# Patient Record
Sex: FEMALE | Race: BLACK OR AFRICAN AMERICAN | NOT HISPANIC OR LATINO | URBAN - METROPOLITAN AREA
[De-identification: names, ages, dates, MRNs, and addresses within clinical notes are randomized per-mention and may not be internally consistent; named-entity substitution may affect disease eponyms.]

---

## 2019-06-01 ENCOUNTER — INPATIENT (INPATIENT)
Facility: HOSPITAL | Age: 49
LOS: 12 days | Discharge: HOME | End: 2019-06-14
Attending: INTERNAL MEDICINE | Admitting: INTERNAL MEDICINE
Payer: MEDICAID

## 2019-06-01 VITALS
TEMPERATURE: 99 F | RESPIRATION RATE: 20 BRPM | OXYGEN SATURATION: 99 % | SYSTOLIC BLOOD PRESSURE: 154 MMHG | HEART RATE: 99 BPM | DIASTOLIC BLOOD PRESSURE: 87 MMHG

## 2019-06-01 DIAGNOSIS — F10.239 ALCOHOL DEPENDENCE WITH WITHDRAWAL, UNSPECIFIED: ICD-10-CM

## 2019-06-01 DIAGNOSIS — R56.9 UNSPECIFIED CONVULSIONS: ICD-10-CM

## 2019-06-01 DIAGNOSIS — Z78.9 OTHER SPECIFIED HEALTH STATUS: ICD-10-CM

## 2019-06-01 DIAGNOSIS — E83.42 HYPOMAGNESEMIA: ICD-10-CM

## 2019-06-01 LAB
ALBUMIN SERPL ELPH-MCNC: 3.6 G/DL — SIGNIFICANT CHANGE UP (ref 3.3–5)
ALBUMIN SERPL ELPH-MCNC: 3.9 G/DL — SIGNIFICANT CHANGE UP (ref 3.3–5)
ALP SERPL-CCNC: 76 U/L — SIGNIFICANT CHANGE UP (ref 40–120)
ALP SERPL-CCNC: 82 U/L — SIGNIFICANT CHANGE UP (ref 40–120)
ALT FLD-CCNC: 53 U/L — SIGNIFICANT CHANGE UP (ref 12–78)
ALT FLD-CCNC: 59 U/L — SIGNIFICANT CHANGE UP (ref 12–78)
ANION GAP SERPL CALC-SCNC: 13 MMOL/L — SIGNIFICANT CHANGE UP (ref 5–17)
ANION GAP SERPL CALC-SCNC: 16 MMOL/L — SIGNIFICANT CHANGE UP (ref 5–17)
AST SERPL-CCNC: 70 U/L — HIGH (ref 15–37)
AST SERPL-CCNC: 78 U/L — HIGH (ref 15–37)
BASOPHILS # BLD AUTO: 0.03 K/UL — SIGNIFICANT CHANGE UP (ref 0–0.2)
BASOPHILS NFR BLD AUTO: 0.4 % — SIGNIFICANT CHANGE UP (ref 0–2)
BILIRUB DIRECT SERPL-MCNC: 0.45 MG/DL — HIGH (ref 0.05–0.2)
BILIRUB INDIRECT FLD-MCNC: 1.2 MG/DL — HIGH (ref 0.2–1)
BILIRUB SERPL-MCNC: 1.3 MG/DL — HIGH (ref 0.2–1.2)
BILIRUB SERPL-MCNC: 1.6 MG/DL — HIGH (ref 0.2–1.2)
BUN SERPL-MCNC: 4 MG/DL — LOW (ref 7–23)
BUN SERPL-MCNC: 6 MG/DL — LOW (ref 7–23)
CALCIUM SERPL-MCNC: 10 MG/DL — SIGNIFICANT CHANGE UP (ref 8.5–10.1)
CALCIUM SERPL-MCNC: 8.9 MG/DL — SIGNIFICANT CHANGE UP (ref 8.5–10.1)
CHLORIDE SERPL-SCNC: 96 MMOL/L — SIGNIFICANT CHANGE UP (ref 96–108)
CHLORIDE SERPL-SCNC: 99 MMOL/L — SIGNIFICANT CHANGE UP (ref 96–108)
CO2 SERPL-SCNC: 26 MMOL/L — SIGNIFICANT CHANGE UP (ref 22–31)
CO2 SERPL-SCNC: 26 MMOL/L — SIGNIFICANT CHANGE UP (ref 22–31)
CREAT SERPL-MCNC: 0.68 MG/DL — SIGNIFICANT CHANGE UP (ref 0.5–1.3)
CREAT SERPL-MCNC: 0.85 MG/DL — SIGNIFICANT CHANGE UP (ref 0.5–1.3)
EOSINOPHIL # BLD AUTO: 0 K/UL — SIGNIFICANT CHANGE UP (ref 0–0.5)
EOSINOPHIL NFR BLD AUTO: 0 % — SIGNIFICANT CHANGE UP (ref 0–6)
ETHANOL SERPL-MCNC: <10 MG/DL — SIGNIFICANT CHANGE UP (ref 0–10)
GLUCOSE BLDC GLUCOMTR-MCNC: 128 MG/DL — HIGH (ref 70–99)
GLUCOSE SERPL-MCNC: 131 MG/DL — HIGH (ref 70–99)
GLUCOSE SERPL-MCNC: 92 MG/DL — SIGNIFICANT CHANGE UP (ref 70–99)
HCT VFR BLD CALC: 33.3 % — LOW (ref 34.5–45)
HGB BLD-MCNC: 10.3 G/DL — LOW (ref 11.5–15.5)
IMM GRANULOCYTES NFR BLD AUTO: 0.8 % — SIGNIFICANT CHANGE UP (ref 0–1.5)
LACTATE SERPL-SCNC: 6.3 MMOL/L — CRITICAL HIGH (ref 0.7–2)
LYMPHOCYTES # BLD AUTO: 0.59 K/UL — LOW (ref 1–3.3)
LYMPHOCYTES # BLD AUTO: 6.9 % — LOW (ref 13–44)
MAGNESIUM SERPL-MCNC: 1 MG/DL — CRITICAL LOW (ref 1.6–2.6)
MAGNESIUM SERPL-MCNC: 1.4 MG/DL — LOW (ref 1.6–2.6)
MCHC RBC-ENTMCNC: 22.8 PG — LOW (ref 27–34)
MCHC RBC-ENTMCNC: 30.9 GM/DL — LOW (ref 32–36)
MCV RBC AUTO: 73.8 FL — LOW (ref 80–100)
MONOCYTES # BLD AUTO: 0.37 K/UL — SIGNIFICANT CHANGE UP (ref 0–0.9)
MONOCYTES NFR BLD AUTO: 4.3 % — SIGNIFICANT CHANGE UP (ref 2–14)
NEUTROPHILS # BLD AUTO: 7.51 K/UL — HIGH (ref 1.8–7.4)
NEUTROPHILS NFR BLD AUTO: 87.6 % — HIGH (ref 43–77)
NRBC # BLD: 0 /100 WBCS — SIGNIFICANT CHANGE UP (ref 0–0)
PHOSPHATE SERPL-MCNC: 2.9 MG/DL — SIGNIFICANT CHANGE UP (ref 2.5–4.5)
PHOSPHATE SERPL-MCNC: 3.8 MG/DL — SIGNIFICANT CHANGE UP (ref 2.5–4.5)
PLATELET # BLD AUTO: 217 K/UL — SIGNIFICANT CHANGE UP (ref 150–400)
POTASSIUM SERPL-MCNC: 2.9 MMOL/L — CRITICAL LOW (ref 3.5–5.3)
POTASSIUM SERPL-MCNC: 3 MMOL/L — LOW (ref 3.5–5.3)
POTASSIUM SERPL-SCNC: 2.9 MMOL/L — CRITICAL LOW (ref 3.5–5.3)
POTASSIUM SERPL-SCNC: 3 MMOL/L — LOW (ref 3.5–5.3)
PROT SERPL-MCNC: 8.3 GM/DL — SIGNIFICANT CHANGE UP (ref 6–8.3)
PROT SERPL-MCNC: 8.7 GM/DL — HIGH (ref 6–8.3)
RBC # BLD: 4.51 M/UL — SIGNIFICANT CHANGE UP (ref 3.8–5.2)
RBC # FLD: 19.4 % — HIGH (ref 10.3–14.5)
SODIUM SERPL-SCNC: 138 MMOL/L — SIGNIFICANT CHANGE UP (ref 135–145)
SODIUM SERPL-SCNC: 138 MMOL/L — SIGNIFICANT CHANGE UP (ref 135–145)
WBC # BLD: 8.57 K/UL — SIGNIFICANT CHANGE UP (ref 3.8–10.5)
WBC # FLD AUTO: 8.57 K/UL — SIGNIFICANT CHANGE UP (ref 3.8–10.5)

## 2019-06-01 PROCEDURE — 99285 EMERGENCY DEPT VISIT HI MDM: CPT

## 2019-06-01 PROCEDURE — 71045 X-RAY EXAM CHEST 1 VIEW: CPT | Mod: 26

## 2019-06-01 PROCEDURE — 93010 ELECTROCARDIOGRAM REPORT: CPT

## 2019-06-01 PROCEDURE — 70450 CT HEAD/BRAIN W/O DYE: CPT | Mod: 26

## 2019-06-01 RX ORDER — SODIUM CHLORIDE 9 MG/ML
2000 INJECTION INTRAMUSCULAR; INTRAVENOUS; SUBCUTANEOUS ONCE
Refills: 0 | Status: COMPLETED | OUTPATIENT
Start: 2019-06-01 | End: 2019-06-01

## 2019-06-01 RX ORDER — MAGNESIUM OXIDE 400 MG ORAL TABLET 241.3 MG
800 TABLET ORAL EVERY 4 HOURS
Refills: 0 | Status: COMPLETED | OUTPATIENT
Start: 2019-06-01 | End: 2019-06-02

## 2019-06-01 RX ORDER — ACETAMINOPHEN 500 MG
650 TABLET ORAL EVERY 6 HOURS
Refills: 0 | Status: DISCONTINUED | OUTPATIENT
Start: 2019-06-01 | End: 2019-06-14

## 2019-06-01 RX ORDER — MAGNESIUM SULFATE 500 MG/ML
2 VIAL (ML) INJECTION ONCE
Refills: 0 | Status: DISCONTINUED | OUTPATIENT
Start: 2019-06-01 | End: 2019-06-01

## 2019-06-01 RX ORDER — POTASSIUM CHLORIDE 20 MEQ
10 PACKET (EA) ORAL
Refills: 0 | Status: DISCONTINUED | OUTPATIENT
Start: 2019-06-01 | End: 2019-06-01

## 2019-06-01 RX ORDER — MAGNESIUM OXIDE 400 MG ORAL TABLET 241.3 MG
400 TABLET ORAL ONCE
Refills: 0 | Status: DISCONTINUED | OUTPATIENT
Start: 2019-06-01 | End: 2019-06-01

## 2019-06-01 RX ORDER — FOLIC ACID 0.8 MG
1 TABLET ORAL DAILY
Refills: 0 | Status: DISCONTINUED | OUTPATIENT
Start: 2019-06-01 | End: 2019-06-14

## 2019-06-01 RX ORDER — SODIUM CHLORIDE 9 MG/ML
1000 INJECTION, SOLUTION INTRAVENOUS
Refills: 0 | Status: DISCONTINUED | OUTPATIENT
Start: 2019-06-01 | End: 2019-06-05

## 2019-06-01 RX ORDER — THIAMINE MONONITRATE (VIT B1) 100 MG
100 TABLET ORAL DAILY
Refills: 0 | Status: COMPLETED | OUTPATIENT
Start: 2019-06-01 | End: 2019-06-04

## 2019-06-01 RX ORDER — MAGNESIUM SULFATE 500 MG/ML
2 VIAL (ML) INJECTION ONCE
Refills: 0 | Status: COMPLETED | OUTPATIENT
Start: 2019-06-01 | End: 2019-06-01

## 2019-06-01 RX ORDER — POTASSIUM CHLORIDE 20 MEQ
40 PACKET (EA) ORAL ONCE
Refills: 0 | Status: DISCONTINUED | OUTPATIENT
Start: 2019-06-01 | End: 2019-06-01

## 2019-06-01 RX ORDER — POTASSIUM CHLORIDE 20 MEQ
40 PACKET (EA) ORAL EVERY 4 HOURS
Refills: 0 | Status: COMPLETED | OUTPATIENT
Start: 2019-06-01 | End: 2019-06-02

## 2019-06-01 RX ORDER — POTASSIUM CHLORIDE 20 MEQ
10 PACKET (EA) ORAL ONCE
Refills: 0 | Status: COMPLETED | OUTPATIENT
Start: 2019-06-01 | End: 2019-06-01

## 2019-06-01 RX ORDER — PANTOPRAZOLE SODIUM 20 MG/1
40 TABLET, DELAYED RELEASE ORAL
Refills: 0 | Status: DISCONTINUED | OUTPATIENT
Start: 2019-06-01 | End: 2019-06-14

## 2019-06-01 RX ORDER — HEPARIN SODIUM 5000 [USP'U]/ML
5000 INJECTION INTRAVENOUS; SUBCUTANEOUS EVERY 12 HOURS
Refills: 0 | Status: DISCONTINUED | OUTPATIENT
Start: 2019-06-01 | End: 2019-06-14

## 2019-06-01 RX ADMIN — Medication 50 GRAM(S): at 15:25

## 2019-06-01 RX ADMIN — Medication 650 MILLIGRAM(S): at 19:39

## 2019-06-01 RX ADMIN — Medication 650 MILLIGRAM(S): at 17:30

## 2019-06-01 RX ADMIN — HEPARIN SODIUM 5000 UNIT(S): 5000 INJECTION INTRAVENOUS; SUBCUTANEOUS at 19:51

## 2019-06-01 RX ADMIN — Medication 100 MILLIEQUIVALENT(S): at 15:24

## 2019-06-01 RX ADMIN — SODIUM CHLORIDE 2000 MILLILITER(S): 9 INJECTION INTRAMUSCULAR; INTRAVENOUS; SUBCUTANEOUS at 15:00

## 2019-06-01 RX ADMIN — Medication 2 MILLIGRAM(S): at 21:43

## 2019-06-01 RX ADMIN — Medication 50 GRAM(S): at 19:51

## 2019-06-01 RX ADMIN — SODIUM CHLORIDE 80 MILLILITER(S): 9 INJECTION, SOLUTION INTRAVENOUS at 18:01

## 2019-06-01 RX ADMIN — Medication 2 MILLIGRAM(S): at 14:30

## 2019-06-01 RX ADMIN — Medication 2 MILLIGRAM(S): at 18:18

## 2019-06-01 NOTE — ED ADULT NURSE REASSESSMENT NOTE - NS ED NURSE REASSESS COMMENT FT1
pt seizure noted at bedside , medicated and safety pt maintained , hob increased and oxygen in place. ED Attending aware and at bedside, contd to monitor

## 2019-06-01 NOTE — ED PROVIDER NOTE - OBJECTIVE STATEMENT
48 year old female with PMH of alcohol abuse, otherwise denies other medical issues - has had prior seizure in past as well but not on any meds - states may have been related to not drinking alcohol. Pt states last drink was 2 days ago.

## 2019-06-01 NOTE — PROVIDER CONTACT NOTE (CRITICAL VALUE NOTIFICATION) - ACTION/TREATMENT ORDERED:
pt currently on a banana bag, received po potassium in the ed. as per pa lab will be repeated in the am
ivf

## 2019-06-01 NOTE — ED ADULT NURSE NOTE - OBJECTIVE STATEMENT
pt stated she has seizures and does not take medication, pt alert and responsive. seizure precautions maintained

## 2019-06-01 NOTE — H&P ADULT - HISTORY OF PRESENT ILLNESS
47yo, BF with PMH of Alcohol Use, otherwise denies other medical issues, presents with Sz today and yesterday.  Patient  has had prior seizure in past as well but not on any meds - states may have been related to not drinking alcohol. Pt states last drink was 2 days ago. Unable to give amount of daily use.  Patient had sz in ER.  Denies CP, SOB, cough, palpitation, n/v/d, fever, chills, weakness, abdominal pain, dysuria.

## 2019-06-01 NOTE — H&P ADULT - NSHPPHYSICALEXAM_GEN_ALL_CORE
PHYSICAL EXAM:  GENERAL: NAD,   HEAD:  Atraumatic, Normocephalic  EYES: EOMI, PERRLA, conjunctiva and sclera clear  ENMT: No tonsillar erythema, exudates, or enlargement; Moist mucous membranes, No lesions  NECK: Supple, No JVD, Normal thyroid  NERVOUS SYSTEM:  Sleepy; Motor Strength 5/5 B/L; No tremor  CHEST/LUNG: Clear bilaterally; No rales, rhonchi, wheezing, or rubs  HEART: Regular rate and rhythm; No murmurs, rubs, or gallops  ABDOMEN: Soft, Nontender, Nondistended; Bowel sounds present  EXTREMITIES:  2+ Peripheral Pulses, No clubbing, cyanosis, or edema  LYMPH: No lymphadenopathy noted  SKIN: No rashes or lesions

## 2019-06-01 NOTE — H&P ADULT - NSHPLABSRESULTS_GEN_ALL_CORE
LABS:                        10.3   8.57  )-----------( 217      ( 01 Jun 2019 14:00 )             33.3     06-01    138  |  96  |  4<L>  ----------------------------<  131<H>  3.0<L>   |  26  |  0.85    Ca    10.0      01 Jun 2019 14:00  Phos  2.9     06-01  Mg     1.0     06-01    TPro  8.7<H>  /  Alb  3.9  /  TBili  1.3<H>  /  DBili  x   /  AST  78<H>  /  ALT  59  /  AlkPhos  82  06-01        CAPILLARY BLOOD GLUCOSE      POCT Blood Glucose.: 128 mg/dL (01 Jun 2019 12:14)      Magnesium, Serum: 1.0: TYPE:(C=Critical, N=Notification, A=Abnormal) C    < from: CT Head No Cont (06.01.19 @ 15:34) >      IMPRESSION:    Unremarkable noncontrast CT of the brain.      < end of copied text >    < from: Xray Chest 1 View-PORTABLE IMMEDIATE (06.01.19 @ 13:37) >      IMPRESSION:    Clear lungs.      < end of copied text >

## 2019-06-01 NOTE — ED ADULT TRIAGE NOTE - CHIEF COMPLAINT QUOTE
BIBA for  witnessed seizure at home. history of alcohol abuse. unknown when pt had last drink. pt awake alert at triage. denies history of seizure.

## 2019-06-01 NOTE — ED ADULT NURSE NOTE - NSIMPLEMENTINTERV_GEN_ALL_ED
Implemented All Universal Safety Interventions:  [place] to call system. Call bell, personal items and telephone within reach. Instruct patient to call for assistance. Room bathroom lighting operational. Non-slip footwear when patient is off stretcher. Physically safe environment: no spills, clutter or unnecessary equipment. Stretcher in lowest position, wheels locked, appropriate side rails in place.

## 2019-06-02 LAB
ALBUMIN SERPL ELPH-MCNC: 3.3 G/DL — SIGNIFICANT CHANGE UP (ref 3.3–5)
ALP SERPL-CCNC: 68 U/L — SIGNIFICANT CHANGE UP (ref 40–120)
ALT FLD-CCNC: 49 U/L — SIGNIFICANT CHANGE UP (ref 12–78)
AMPHET UR-MCNC: NEGATIVE — SIGNIFICANT CHANGE UP
ANION GAP SERPL CALC-SCNC: 9 MMOL/L — SIGNIFICANT CHANGE UP (ref 5–17)
APPEARANCE UR: ABNORMAL
AST SERPL-CCNC: 53 U/L — HIGH (ref 15–37)
BACTERIA # UR AUTO: ABNORMAL
BARBITURATES UR SCN-MCNC: NEGATIVE — SIGNIFICANT CHANGE UP
BENZODIAZ UR-MCNC: NEGATIVE — SIGNIFICANT CHANGE UP
BILIRUB SERPL-MCNC: 1.4 MG/DL — HIGH (ref 0.2–1.2)
BILIRUB UR-MCNC: ABNORMAL
BUN SERPL-MCNC: 8 MG/DL — SIGNIFICANT CHANGE UP (ref 7–23)
CALCIUM SERPL-MCNC: 8.6 MG/DL — SIGNIFICANT CHANGE UP (ref 8.5–10.1)
CHLORIDE SERPL-SCNC: 102 MMOL/L — SIGNIFICANT CHANGE UP (ref 96–108)
CO2 SERPL-SCNC: 30 MMOL/L — SIGNIFICANT CHANGE UP (ref 22–31)
COCAINE METAB.OTHER UR-MCNC: NEGATIVE — SIGNIFICANT CHANGE UP
COLOR SPEC: ABNORMAL
CREAT SERPL-MCNC: 0.49 MG/DL — LOW (ref 0.5–1.3)
DIFF PNL FLD: ABNORMAL
EPI CELLS # UR: ABNORMAL
GLUCOSE SERPL-MCNC: 76 MG/DL — SIGNIFICANT CHANGE UP (ref 70–99)
GLUCOSE UR QL: NEGATIVE MG/DL — SIGNIFICANT CHANGE UP
GRAN CASTS # UR COMP ASSIST: ABNORMAL /LPF
HAV IGM SER-ACNC: SIGNIFICANT CHANGE UP
HBV CORE IGM SER-ACNC: SIGNIFICANT CHANGE UP
HBV SURFACE AG SER-ACNC: SIGNIFICANT CHANGE UP
HCT VFR BLD CALC: 31.8 % — LOW (ref 34.5–45)
HCV AB S/CO SERPL IA: 0.09 S/CO — SIGNIFICANT CHANGE UP (ref 0–0.99)
HCV AB SERPL-IMP: SIGNIFICANT CHANGE UP
HGB BLD-MCNC: 9.8 G/DL — LOW (ref 11.5–15.5)
KETONES UR-MCNC: ABNORMAL
LEUKOCYTE ESTERASE UR-ACNC: ABNORMAL
MAGNESIUM SERPL-MCNC: 2.2 MG/DL — SIGNIFICANT CHANGE UP (ref 1.6–2.6)
MCHC RBC-ENTMCNC: 23.1 PG — LOW (ref 27–34)
MCHC RBC-ENTMCNC: 30.8 GM/DL — LOW (ref 32–36)
MCV RBC AUTO: 75 FL — LOW (ref 80–100)
METHADONE UR-MCNC: NEGATIVE — SIGNIFICANT CHANGE UP
NITRITE UR-MCNC: NEGATIVE — SIGNIFICANT CHANGE UP
NRBC # BLD: 0 /100 WBCS — SIGNIFICANT CHANGE UP (ref 0–0)
OPIATES UR-MCNC: NEGATIVE — SIGNIFICANT CHANGE UP
PCP SPEC-MCNC: SIGNIFICANT CHANGE UP
PCP UR-MCNC: NEGATIVE — SIGNIFICANT CHANGE UP
PH UR: 6 — SIGNIFICANT CHANGE UP (ref 5–8)
PHOSPHATE SERPL-MCNC: 3.3 MG/DL — SIGNIFICANT CHANGE UP (ref 2.5–4.5)
PLATELET # BLD AUTO: 198 K/UL — SIGNIFICANT CHANGE UP (ref 150–400)
POTASSIUM SERPL-MCNC: 3.5 MMOL/L — SIGNIFICANT CHANGE UP (ref 3.5–5.3)
POTASSIUM SERPL-SCNC: 3.5 MMOL/L — SIGNIFICANT CHANGE UP (ref 3.5–5.3)
PROT SERPL-MCNC: 7.6 GM/DL — SIGNIFICANT CHANGE UP (ref 6–8.3)
PROT UR-MCNC: 500 MG/DL
RBC # BLD: 4.24 M/UL — SIGNIFICANT CHANGE UP (ref 3.8–5.2)
RBC # FLD: 19.9 % — HIGH (ref 10.3–14.5)
RBC CASTS # UR COMP ASSIST: ABNORMAL /HPF (ref 0–4)
SODIUM SERPL-SCNC: 141 MMOL/L — SIGNIFICANT CHANGE UP (ref 135–145)
SP GR SPEC: 1.02 — SIGNIFICANT CHANGE UP (ref 1.01–1.02)
THC UR QL: NEGATIVE — SIGNIFICANT CHANGE UP
UROBILINOGEN FLD QL: 4 MG/DL
WBC # BLD: 7.88 K/UL — SIGNIFICANT CHANGE UP (ref 3.8–10.5)
WBC # FLD AUTO: 7.88 K/UL — SIGNIFICANT CHANGE UP (ref 3.8–10.5)
WBC UR QL: SIGNIFICANT CHANGE UP

## 2019-06-02 RX ORDER — SODIUM CHLORIDE 9 MG/ML
1000 INJECTION INTRAMUSCULAR; INTRAVENOUS; SUBCUTANEOUS
Refills: 0 | Status: DISCONTINUED | OUTPATIENT
Start: 2019-06-02 | End: 2019-06-05

## 2019-06-02 RX ADMIN — Medication 2 MILLIGRAM(S): at 02:05

## 2019-06-02 RX ADMIN — Medication 2 MILLIGRAM(S): at 10:58

## 2019-06-02 RX ADMIN — Medication 2 MILLIGRAM(S): at 15:47

## 2019-06-02 RX ADMIN — Medication 2 MILLIGRAM(S): at 13:45

## 2019-06-02 RX ADMIN — Medication 2 MILLIGRAM(S): at 18:20

## 2019-06-02 RX ADMIN — Medication 40 MILLIEQUIVALENT(S): at 00:33

## 2019-06-02 RX ADMIN — Medication 1.5 MILLIGRAM(S): at 21:57

## 2019-06-02 RX ADMIN — Medication 1 TABLET(S): at 11:05

## 2019-06-02 RX ADMIN — HEPARIN SODIUM 5000 UNIT(S): 5000 INJECTION INTRAVENOUS; SUBCUTANEOUS at 05:32

## 2019-06-02 RX ADMIN — Medication 40 MILLIEQUIVALENT(S): at 05:32

## 2019-06-02 RX ADMIN — Medication 1.5 MILLIGRAM(S): at 17:19

## 2019-06-02 RX ADMIN — HEPARIN SODIUM 5000 UNIT(S): 5000 INJECTION INTRAVENOUS; SUBCUTANEOUS at 17:22

## 2019-06-02 RX ADMIN — PANTOPRAZOLE SODIUM 40 MILLIGRAM(S): 20 TABLET, DELAYED RELEASE ORAL at 05:32

## 2019-06-02 RX ADMIN — SODIUM CHLORIDE 80 MILLILITER(S): 9 INJECTION INTRAMUSCULAR; INTRAVENOUS; SUBCUTANEOUS at 17:23

## 2019-06-02 RX ADMIN — MAGNESIUM OXIDE 400 MG ORAL TABLET 800 MILLIGRAM(S): 241.3 TABLET ORAL at 00:33

## 2019-06-02 RX ADMIN — Medication 40 MILLIEQUIVALENT(S): at 02:06

## 2019-06-02 RX ADMIN — Medication 100 MILLIGRAM(S): at 11:05

## 2019-06-02 RX ADMIN — Medication 1 MILLIGRAM(S): at 11:05

## 2019-06-02 RX ADMIN — Medication 2 MILLIGRAM(S): at 20:58

## 2019-06-02 RX ADMIN — MAGNESIUM OXIDE 400 MG ORAL TABLET 800 MILLIGRAM(S): 241.3 TABLET ORAL at 02:06

## 2019-06-02 RX ADMIN — Medication 2 MILLIGRAM(S): at 12:53

## 2019-06-02 RX ADMIN — Medication 2 MILLIGRAM(S): at 05:31

## 2019-06-02 NOTE — PROGRESS NOTE ADULT - SUBJECTIVE AND OBJECTIVE BOX
Patient is a 48y old  Female who presents with a chief complaint of Seizure (2019 19:04)      SUBJECTIVE/OBJECTIVE:    Restless.   CIWA 16    MEDICATIONS  (STANDING):  folic acid 1 milliGRAM(s) Oral daily  heparin  Injectable 5000 Unit(s) SubCutaneous every 12 hours  LORazepam   Injectable   IV Push   LORazepam   Injectable 1.5 milliGRAM(s) IV Push every 4 hours  multivitamin 1 Tablet(s) Oral daily  pantoprazole    Tablet 40 milliGRAM(s) Oral before breakfast  sodium chloride 0.9% 1000 milliLiter(s) (80 mL/Hr) IV Continuous <Continuous>  sodium chloride 0.9%. 1000 milliLiter(s) (80 mL/Hr) IV Continuous <Continuous>  thiamine 100 milliGRAM(s) Oral daily    MEDICATIONS  (PRN):  acetaminophen   Tablet .. 650 milliGRAM(s) Oral every 6 hours PRN Mild Pain (1 - 3)  LORazepam   Injectable 2 milliGRAM(s) IV Push every 2 hours PRN CIWA-Ar score increase by 2 points and a total score of 7 or less      Allergies    No Known Allergies    Intolerances          Vital Signs Last 24 Hrs  T(C): 37.1 (2019 11:06), Max: 38 (2019 00:05)  T(F): 98.8 (2019 11:06), Max: 100.4 (2019 00:05)  HR: 96 (2019 11:06) (82 - 106)  BP: 152/92 (2019 11:06) (152/92 - 170/85)  BP(mean): --  RR: 17 (2019 11:06) (17 - 20)  SpO2: 100% (2019 11:06) (96% - 100%)        Physical Exam:  Physical Exam: PHYSICAL EXAM:  GENERAL: NAD,   HEAD:  Atraumatic, Normocephalic  EYES: EOMI, PERRLA, conjunctiva and sclera clear  ENMT: No tonsillar erythema, exudates, or enlargement; Moist mucous membranes, No lesions  NECK: Supple, No JVD, Normal thyroid  NERVOUS SYSTEM:  Sleepy; Motor Strength 5/5 B/L; No tremor  CHEST/LUNG: Clear bilaterally; No rales, rhonchi, wheezing, or rubs  HEART: Regular rate and rhythm; No murmurs, rubs, or gallops  ABDOMEN: Soft, Nontender, Nondistended; Bowel sounds present  EXTREMITIES:  2+ Peripheral Pulses, No clubbing, cyanosis, or edema  LYMPH: No lymphadenopathy noted SKIN: No rashes or lesions	          LABS:                        9.8    7.88  )-----------( 198      ( 2019 07:03 )             31.8     06-    141  |  102  |  8   ----------------------------<  76  3.5   |  30  |  0.49<L>    Ca    8.6      2019 07:03  Phos  3.3     06-  Mg     2.2     06-    TPro  7.6  /  Alb  3.3  /  TBili  1.4<H>  /  DBili  x   /  AST  53<H>  /  ALT  49  /  AlkPhos  68  06-      Urinalysis Basic - ( 2019 06:52 )    Color: [place] / Appearance: Slightly Turbid / S.020 / pH: x  Gluc: x / Ketone: Small  / Bili: Small / Urobili: 4 mg/dL   Blood: x / Protein: 500 mg/dL / Nitrite: Negative   Leuk Esterase: Trace / RBC: 3-5 /HPF / WBC 3-5   Sq Epi: x / Non Sq Epi: Moderate / Bacteria: Moderate      CAPILLARY BLOOD GLUCOSE              RADIOLOGY & ADDITIONAL TESTS:        Consultant(s) Notes Reviewed:  [ ] YES  [ ] NO

## 2019-06-03 LAB
ALBUMIN SERPL ELPH-MCNC: 3.4 G/DL — SIGNIFICANT CHANGE UP (ref 3.3–5)
ALP SERPL-CCNC: 63 U/L — SIGNIFICANT CHANGE UP (ref 40–120)
ALT FLD-CCNC: 49 U/L — SIGNIFICANT CHANGE UP (ref 12–78)
ANION GAP SERPL CALC-SCNC: 6 MMOL/L — SIGNIFICANT CHANGE UP (ref 5–17)
AST SERPL-CCNC: 69 U/L — HIGH (ref 15–37)
BILIRUB SERPL-MCNC: 0.7 MG/DL — SIGNIFICANT CHANGE UP (ref 0.2–1.2)
BUN SERPL-MCNC: 9 MG/DL — SIGNIFICANT CHANGE UP (ref 7–23)
CALCIUM SERPL-MCNC: 9.5 MG/DL — SIGNIFICANT CHANGE UP (ref 8.5–10.1)
CHLORIDE SERPL-SCNC: 103 MMOL/L — SIGNIFICANT CHANGE UP (ref 96–108)
CO2 SERPL-SCNC: 28 MMOL/L — SIGNIFICANT CHANGE UP (ref 22–31)
CREAT SERPL-MCNC: 0.6 MG/DL — SIGNIFICANT CHANGE UP (ref 0.5–1.3)
CULTURE RESULTS: SIGNIFICANT CHANGE UP
GLUCOSE BLDC GLUCOMTR-MCNC: 95 MG/DL — SIGNIFICANT CHANGE UP (ref 70–99)
GLUCOSE SERPL-MCNC: 92 MG/DL — SIGNIFICANT CHANGE UP (ref 70–99)
HCT VFR BLD CALC: 31.8 % — LOW (ref 34.5–45)
HGB BLD-MCNC: 9.8 G/DL — LOW (ref 11.5–15.5)
MAGNESIUM SERPL-MCNC: 1.7 MG/DL — SIGNIFICANT CHANGE UP (ref 1.6–2.6)
MCHC RBC-ENTMCNC: 23.3 PG — LOW (ref 27–34)
MCHC RBC-ENTMCNC: 30.8 GM/DL — LOW (ref 32–36)
MCV RBC AUTO: 75.5 FL — LOW (ref 80–100)
NRBC # BLD: 0 /100 WBCS — SIGNIFICANT CHANGE UP (ref 0–0)
PLATELET # BLD AUTO: 189 K/UL — SIGNIFICANT CHANGE UP (ref 150–400)
POTASSIUM SERPL-MCNC: 3.3 MMOL/L — LOW (ref 3.5–5.3)
POTASSIUM SERPL-SCNC: 3.3 MMOL/L — LOW (ref 3.5–5.3)
PROT SERPL-MCNC: 7.4 GM/DL — SIGNIFICANT CHANGE UP (ref 6–8.3)
RBC # BLD: 4.21 M/UL — SIGNIFICANT CHANGE UP (ref 3.8–5.2)
RBC # FLD: 19.4 % — HIGH (ref 10.3–14.5)
SODIUM SERPL-SCNC: 137 MMOL/L — SIGNIFICANT CHANGE UP (ref 135–145)
SPECIMEN SOURCE: SIGNIFICANT CHANGE UP
WBC # BLD: 4.99 K/UL — SIGNIFICANT CHANGE UP (ref 3.8–10.5)
WBC # FLD AUTO: 4.99 K/UL — SIGNIFICANT CHANGE UP (ref 3.8–10.5)

## 2019-06-03 RX ORDER — POTASSIUM CHLORIDE 20 MEQ
10 PACKET (EA) ORAL
Refills: 0 | Status: DISCONTINUED | OUTPATIENT
Start: 2019-06-03 | End: 2019-06-03

## 2019-06-03 RX ORDER — MAGNESIUM SULFATE 500 MG/ML
1 VIAL (ML) INJECTION ONCE
Refills: 0 | Status: COMPLETED | OUTPATIENT
Start: 2019-06-03 | End: 2019-06-03

## 2019-06-03 RX ORDER — POTASSIUM CHLORIDE 20 MEQ
20 PACKET (EA) ORAL
Refills: 0 | Status: DISCONTINUED | OUTPATIENT
Start: 2019-06-03 | End: 2019-06-03

## 2019-06-03 RX ORDER — PHENOBARBITAL 60 MG
130 TABLET ORAL ONCE
Refills: 0 | Status: DISCONTINUED | OUTPATIENT
Start: 2019-06-03 | End: 2019-06-03

## 2019-06-03 RX ORDER — POTASSIUM CHLORIDE 20 MEQ
10 PACKET (EA) ORAL
Refills: 0 | Status: COMPLETED | OUTPATIENT
Start: 2019-06-03 | End: 2019-06-03

## 2019-06-03 RX ORDER — DIPHENHYDRAMINE HCL 50 MG
25 CAPSULE ORAL ONCE
Refills: 0 | Status: COMPLETED | OUTPATIENT
Start: 2019-06-03 | End: 2019-06-03

## 2019-06-03 RX ADMIN — Medication 2 MILLIGRAM(S): at 05:58

## 2019-06-03 RX ADMIN — Medication 4 MILLIGRAM(S): at 13:01

## 2019-06-03 RX ADMIN — Medication 2 MILLIGRAM(S): at 00:55

## 2019-06-03 RX ADMIN — SODIUM CHLORIDE 80 MILLILITER(S): 9 INJECTION INTRAMUSCULAR; INTRAVENOUS; SUBCUTANEOUS at 19:32

## 2019-06-03 RX ADMIN — Medication 4 MILLIGRAM(S): at 07:36

## 2019-06-03 RX ADMIN — Medication 130 MILLIGRAM(S): at 07:52

## 2019-06-03 RX ADMIN — Medication 100 MILLIEQUIVALENT(S): at 13:03

## 2019-06-03 RX ADMIN — Medication 100 GRAM(S): at 11:46

## 2019-06-03 RX ADMIN — Medication 1.5 MILLIGRAM(S): at 05:33

## 2019-06-03 RX ADMIN — Medication 1.5 MILLIGRAM(S): at 02:07

## 2019-06-03 RX ADMIN — Medication 4 MILLIGRAM(S): at 10:17

## 2019-06-03 RX ADMIN — Medication 4 MILLIGRAM(S): at 18:18

## 2019-06-03 RX ADMIN — Medication 4 MILLIGRAM(S): at 21:00

## 2019-06-03 RX ADMIN — Medication 2 MILLIGRAM(S): at 19:31

## 2019-06-03 RX ADMIN — Medication 100 MILLIEQUIVALENT(S): at 14:29

## 2019-06-03 RX ADMIN — Medication 2 MILLIGRAM(S): at 15:39

## 2019-06-03 RX ADMIN — Medication 25 MILLIGRAM(S): at 00:55

## 2019-06-03 RX ADMIN — HEPARIN SODIUM 5000 UNIT(S): 5000 INJECTION INTRAVENOUS; SUBCUTANEOUS at 18:18

## 2019-06-03 RX ADMIN — Medication 2 MILLIGRAM(S): at 22:54

## 2019-06-03 RX ADMIN — Medication 100 MILLIEQUIVALENT(S): at 15:34

## 2019-06-03 NOTE — PROGRESS NOTE ADULT - SUBJECTIVE AND OBJECTIVE BOX
Patient is a 48y old  Female who presents with a chief complaint of Seizure (2019 14:25)      SUBJECTIVE/OBJECTIVE:    Sedated. was agitated earlier.    MEDICATIONS  (STANDING):  folic acid 1 milliGRAM(s) Oral daily  heparin  Injectable 5000 Unit(s) SubCutaneous every 12 hours  LORazepam   Injectable 4 milliGRAM(s) IV Push every 4 hours  LORazepam   Injectable   IV Push   multivitamin 1 Tablet(s) Oral daily  pantoprazole    Tablet 40 milliGRAM(s) Oral before breakfast  potassium chloride  10 mEq/100 mL IVPB 10 milliEquivalent(s) IV Intermittent every 1 hour  potassium chloride  10 mEq/100 mL IVPB 10 milliEquivalent(s) IV Intermittent every 1 hour  sodium chloride 0.9% 1000 milliLiter(s) (80 mL/Hr) IV Continuous <Continuous>  sodium chloride 0.9%. 1000 milliLiter(s) (80 mL/Hr) IV Continuous <Continuous>  thiamine 100 milliGRAM(s) Oral daily    MEDICATIONS  (PRN):  acetaminophen   Tablet .. 650 milliGRAM(s) Oral every 6 hours PRN Mild Pain (1 - 3)      Allergies    No Known Allergies    Intolerances          Vital Signs Last 24 Hrs  T(C): 35.9 (2019 07:53), Max: 37.2 (2019 17:45)  T(F): 96.6 (2019 07:53), Max: 98.9 (2019 17:45)  HR: 85 (2019 11:27) (84 - 98)  BP: 135/96 (2019 11:27) (135/96 - 153/103)  BP(mean): --  RR: 18 (2019 11:27) (17 - 20)  SpO2: 99% (2019 11:27) (97% - 99%)    PHYSICAL EXAM:  GENERAL: NAD, well-groomed, well-developed  HEAD:  Atraumatic, Normocephalic  EYES: EOMI, PERRLA, conjunctiva and sclera clear  ENMT: No tonsillar erythema, exudates, or enlargement; Moist mucous membranes, No lesions  NECK: Supple, No JVD, Normal thyroid  NERVOUS SYSTEM:  Alert & Oriented X3; Motor Strength 5/5 B/L   CHEST/LUNG: Clear bilaterally; No rales, rhonchi, wheezing, or rubs  HEART: Regular rate and rhythm; No murmurs, rubs, or gallops  ABDOMEN: Soft, Nontender, Nondistended; Bowel sounds present  EXTREMITIES:  2+ Peripheral Pulses, No clubbing, cyanosis, or edema  LYMPH: No lymphadenopathy noted  SKIN: No rashes or lesions    LABS:                        9.8    4.99  )-----------( 189      ( 2019 07:50 )             31.8     06-03    137  |  103  |  9   ----------------------------<  92  3.3<L>   |  28  |  0.60    Ca    9.5      2019 07:50  Phos  3.3     06-02  Mg     1.7     06-03    TPro  7.4  /  Alb  3.4  /  TBili  0.7  /  DBili  x   /  AST  69<H>  /  ALT  49  /  AlkPhos  63  06-03      Urinalysis Basic - ( 2019 06:52 )    Color: [place] / Appearance: Slightly Turbid / S.020 / pH: x  Gluc: x / Ketone: Small  / Bili: Small / Urobili: 4 mg/dL   Blood: x / Protein: 500 mg/dL / Nitrite: Negative   Leuk Esterase: Trace / RBC: 3-5 /HPF / WBC 3-5   Sq Epi: x / Non Sq Epi: Moderate / Bacteria: Moderate      CAPILLARY BLOOD GLUCOSE      POCT Blood Glucose.: 95 mg/dL (2019 08:09)          RADIOLOGY & ADDITIONAL TESTS:        Consultant(s) Notes Reviewed:  [ ] YES  [ ] NO

## 2019-06-04 DIAGNOSIS — F10.231 ALCOHOL DEPENDENCE WITH WITHDRAWAL DELIRIUM: ICD-10-CM

## 2019-06-04 DIAGNOSIS — F10.10 ALCOHOL ABUSE, UNCOMPLICATED: ICD-10-CM

## 2019-06-04 LAB
ALBUMIN SERPL ELPH-MCNC: 3.2 G/DL — LOW (ref 3.3–5)
ALP SERPL-CCNC: 65 U/L — SIGNIFICANT CHANGE UP (ref 40–120)
ALT FLD-CCNC: 42 U/L — SIGNIFICANT CHANGE UP (ref 12–78)
ANION GAP SERPL CALC-SCNC: 11 MMOL/L — SIGNIFICANT CHANGE UP (ref 5–17)
AST SERPL-CCNC: 55 U/L — HIGH (ref 15–37)
BILIRUB SERPL-MCNC: 0.8 MG/DL — SIGNIFICANT CHANGE UP (ref 0.2–1.2)
BUN SERPL-MCNC: 6 MG/DL — LOW (ref 7–23)
CALCIUM SERPL-MCNC: 9.3 MG/DL — SIGNIFICANT CHANGE UP (ref 8.5–10.1)
CHLORIDE SERPL-SCNC: 105 MMOL/L — SIGNIFICANT CHANGE UP (ref 96–108)
CO2 SERPL-SCNC: 24 MMOL/L — SIGNIFICANT CHANGE UP (ref 22–31)
CREAT SERPL-MCNC: 0.57 MG/DL — SIGNIFICANT CHANGE UP (ref 0.5–1.3)
GLUCOSE SERPL-MCNC: 81 MG/DL — SIGNIFICANT CHANGE UP (ref 70–99)
HCT VFR BLD CALC: 34.9 % — SIGNIFICANT CHANGE UP (ref 34.5–45)
HGB BLD-MCNC: 10.4 G/DL — LOW (ref 11.5–15.5)
MAGNESIUM SERPL-MCNC: 1.5 MG/DL — LOW (ref 1.6–2.6)
MCHC RBC-ENTMCNC: 23.1 PG — LOW (ref 27–34)
MCHC RBC-ENTMCNC: 29.8 GM/DL — LOW (ref 32–36)
MCV RBC AUTO: 77.4 FL — LOW (ref 80–100)
NRBC # BLD: 0 /100 WBCS — SIGNIFICANT CHANGE UP (ref 0–0)
PLATELET # BLD AUTO: 164 K/UL — SIGNIFICANT CHANGE UP (ref 150–400)
POTASSIUM SERPL-MCNC: 3.9 MMOL/L — SIGNIFICANT CHANGE UP (ref 3.5–5.3)
POTASSIUM SERPL-SCNC: 3.9 MMOL/L — SIGNIFICANT CHANGE UP (ref 3.5–5.3)
PROT SERPL-MCNC: 7.2 GM/DL — SIGNIFICANT CHANGE UP (ref 6–8.3)
RBC # BLD: 4.51 M/UL — SIGNIFICANT CHANGE UP (ref 3.8–5.2)
RBC # FLD: 19.8 % — HIGH (ref 10.3–14.5)
SODIUM SERPL-SCNC: 140 MMOL/L — SIGNIFICANT CHANGE UP (ref 135–145)
WBC # BLD: 6.01 K/UL — SIGNIFICANT CHANGE UP (ref 3.8–10.5)
WBC # FLD AUTO: 6.01 K/UL — SIGNIFICANT CHANGE UP (ref 3.8–10.5)

## 2019-06-04 PROCEDURE — 90792 PSYCH DIAG EVAL W/MED SRVCS: CPT

## 2019-06-04 RX ORDER — MAGNESIUM SULFATE 500 MG/ML
2 VIAL (ML) INJECTION ONCE
Refills: 0 | Status: COMPLETED | OUTPATIENT
Start: 2019-06-04 | End: 2019-06-04

## 2019-06-04 RX ORDER — PHENOBARBITAL 60 MG
130 TABLET ORAL ONCE
Refills: 0 | Status: DISCONTINUED | OUTPATIENT
Start: 2019-06-04 | End: 2019-06-04

## 2019-06-04 RX ORDER — THIAMINE MONONITRATE (VIT B1) 100 MG
100 TABLET ORAL DAILY
Refills: 0 | Status: DISCONTINUED | OUTPATIENT
Start: 2019-06-04 | End: 2019-06-04

## 2019-06-04 RX ORDER — THIAMINE MONONITRATE (VIT B1) 100 MG
100 TABLET ORAL DAILY
Refills: 0 | Status: COMPLETED | OUTPATIENT
Start: 2019-06-05 | End: 2019-06-08

## 2019-06-04 RX ORDER — IBUPROFEN 200 MG
400 TABLET ORAL EVERY 6 HOURS
Refills: 0 | Status: DISCONTINUED | OUTPATIENT
Start: 2019-06-04 | End: 2019-06-14

## 2019-06-04 RX ORDER — MAGNESIUM SULFATE 500 MG/ML
1 VIAL (ML) INJECTION ONCE
Refills: 0 | Status: DISCONTINUED | OUTPATIENT
Start: 2019-06-04 | End: 2019-06-04

## 2019-06-04 RX ADMIN — Medication 50 GRAM(S): at 12:37

## 2019-06-04 RX ADMIN — Medication 100 MILLIGRAM(S): at 12:37

## 2019-06-04 RX ADMIN — Medication 3 MILLIGRAM(S): at 17:55

## 2019-06-04 RX ADMIN — HEPARIN SODIUM 5000 UNIT(S): 5000 INJECTION INTRAVENOUS; SUBCUTANEOUS at 17:56

## 2019-06-04 RX ADMIN — Medication 400 MILLIGRAM(S): at 17:54

## 2019-06-04 RX ADMIN — Medication 4 MILLIGRAM(S): at 01:05

## 2019-06-04 RX ADMIN — Medication 650 MILLIGRAM(S): at 01:48

## 2019-06-04 RX ADMIN — Medication 2 MILLIGRAM(S): at 03:01

## 2019-06-04 RX ADMIN — Medication 2 MILLIGRAM(S): at 00:17

## 2019-06-04 RX ADMIN — Medication 3 MILLIGRAM(S): at 21:38

## 2019-06-04 RX ADMIN — Medication 3 MILLIGRAM(S): at 10:03

## 2019-06-04 RX ADMIN — Medication 1 TABLET(S): at 12:37

## 2019-06-04 RX ADMIN — Medication 2 MILLIGRAM(S): at 04:00

## 2019-06-04 RX ADMIN — Medication 3 MILLIGRAM(S): at 14:55

## 2019-06-04 RX ADMIN — SODIUM CHLORIDE 80 MILLILITER(S): 9 INJECTION INTRAMUSCULAR; INTRAVENOUS; SUBCUTANEOUS at 12:38

## 2019-06-04 RX ADMIN — Medication 3 MILLIGRAM(S): at 05:26

## 2019-06-04 RX ADMIN — HEPARIN SODIUM 5000 UNIT(S): 5000 INJECTION INTRAVENOUS; SUBCUTANEOUS at 05:27

## 2019-06-04 RX ADMIN — SODIUM CHLORIDE 80 MILLILITER(S): 9 INJECTION INTRAMUSCULAR; INTRAVENOUS; SUBCUTANEOUS at 06:30

## 2019-06-04 RX ADMIN — Medication 650 MILLIGRAM(S): at 01:18

## 2019-06-04 RX ADMIN — Medication 1 MILLIGRAM(S): at 12:37

## 2019-06-04 RX ADMIN — Medication 130 MILLIGRAM(S): at 04:26

## 2019-06-04 RX ADMIN — Medication 2 MILLIGRAM(S): at 16:57

## 2019-06-04 RX ADMIN — Medication 400 MILLIGRAM(S): at 17:57

## 2019-06-04 RX ADMIN — Medication 2 MILLIGRAM(S): at 12:36

## 2019-06-04 NOTE — PROGRESS NOTE ADULT - SUBJECTIVE AND OBJECTIVE BOX
Patient is a 48y old  Female who presents with a chief complaint of Seizure (04 Jun 2019 04:23)      SUBJECTIVE/OBJECTIVE:    Currently, awake a little confused.  Agitated last night.     MEDICATIONS  (STANDING):  folic acid 1 milliGRAM(s) Oral daily  heparin  Injectable 5000 Unit(s) SubCutaneous every 12 hours  LORazepam   Injectable 3 milliGRAM(s) IV Push every 4 hours  LORazepam   Injectable   IV Push   multivitamin 1 Tablet(s) Oral daily  pantoprazole    Tablet 40 milliGRAM(s) Oral before breakfast  sodium chloride 0.9% 1000 milliLiter(s) (80 mL/Hr) IV Continuous <Continuous>  sodium chloride 0.9%. 1000 milliLiter(s) (80 mL/Hr) IV Continuous <Continuous>    MEDICATIONS  (PRN):  acetaminophen   Tablet .. 650 milliGRAM(s) Oral every 6 hours PRN Mild Pain (1 - 3)  LORazepam   Injectable 2 milliGRAM(s) IV Push every 1 hour PRN CIWA > 7      Allergies    No Known Allergies    Intolerances          Vital Signs Last 24 Hrs  T(C): 36.3 (04 Jun 2019 11:13), Max: 37.1 (04 Jun 2019 00:25)  T(F): 97.3 (04 Jun 2019 11:13), Max: 98.7 (04 Jun 2019 00:25)  HR: 83 (04 Jun 2019 11:13) (78 - 87)  BP: 135/89 (04 Jun 2019 11:13) (135/89 - 156/94)  BP(mean): --  RR: 17 (04 Jun 2019 11:13) (17 - 18)  SpO2: 97% (04 Jun 2019 11:13) (97% - 100%)        PHYSICAL EXAM:  GENERAL: NAD, well-groomed, well-developed  HEAD:  Atraumatic, Normocephalic  EYES: EOMI, PERRLA, conjunctiva and sclera clear  ENMT: No tonsillar erythema, exudates, or enlargement; Moist mucous membranes, No lesions  NECK: Supple, No JVD, Normal thyroid  NERVOUS SYSTEM:  Awake;  Motor Strength 5/5 B/L; No tremor.  CHEST/LUNG: Clear bilaterally; No rales, rhonchi, wheezing, or rubs  HEART: Regular rate and rhythm; No murmurs, rubs, or gallops  ABDOMEN: Soft, Nontender, Nondistended; Bowel sounds present  EXTREMITIES:  2+ Peripheral Pulses, No clubbing, cyanosis, or edema  LYMPH: No lymphadenopathy noted  SKIN: No rashes or lesions        LABS:                        10.4   6.01  )-----------( 164      ( 04 Jun 2019 07:11 )             34.9     06-04    140  |  105  |  6<L>  ----------------------------<  81  3.9   |  24  |  0.57    Ca    9.3      04 Jun 2019 07:11  Mg     1.5     06-04    TPro  7.2  /  Alb  3.2<L>  /  TBili  0.8  /  DBili  x   /  AST  55<H>  /  ALT  42  /  AlkPhos  65  06-04        CAPILLARY BLOOD GLUCOSE              RADIOLOGY & ADDITIONAL TESTS:        Consultant(s) Notes Reviewed:  [xxx ] YES  [ ] NO  CC;  47yo, BF with PMH of Alcohol Use, now in alcohol withdrawal, in agitation  received total of 14mg of ativan, and now 130mg of phenobarbitol, slowly went to sleep  If patient remain CIWA >15, would bring her over to icu for further managment  currently advise to add phenobarbitol 130mg every 2hours prn for CIWA >15

## 2019-06-04 NOTE — BEHAVIORAL HEALTH ASSESSMENT NOTE - OTHER PAST PSYCHIATRIC HISTORY (INCLUDE DETAILS REGARDING ONSET, COURSE OF ILLNESS, INPATIENT/OUTPATIENT TREATMENT)
hx of depression / bereavement due to the death of her younger brother a few years ago when he was 20 years old due to complications from untreated pneumonia.   - no hx of psych admissions/ suicide attempts/ aggression/violence/legal issues  - no hx of psych medication trials

## 2019-06-04 NOTE — BEHAVIORAL HEALTH ASSESSMENT NOTE - SUMMARY
49 yo female with charted hx of Alcohol Abuse (continuous, daily drinking pattern), with hx of withdrawal seizures (onset 05/15; received Keppra) presenting with another withdrawal seizure, placed on CIWA protocol with elevated CIWA scores necessitating addition of phenobarbital as Ativan IVPs have been insufficient to cover the withdrawal symptoms.  RECOMMENDATION:  - agree with ICU Critical care recommendations  - if CIWA remains 15 or above, transfer to ICU  - consider using phenobarbital as primary agent for withdrawal at this time. Limit combination use with Ativan due to synergism / oversedation 47 yo female with charted hx of Alcohol Abuse (continuous, daily drinking pattern), with hx of withdrawal seizures (onset 05/15; received Keppra) presenting with another withdrawal seizure, placed on CIWA protocol with elevated CIWA scores necessitating addition of phenobarbital as Ativan IVPs have been insufficient to cover the withdrawal symptoms.  RECOMMENDATION:  - agree with ICU Critical care recommendations  - if CIWA remains 15 or above, transfer to ICU  - Patient seems to be stable at this time so can continue Ativan IVP  protocol as ordered 47 yo female with charted hx of Alcohol Abuse (continuous, daily drinking pattern), with hx of withdrawal seizures (onset 05/15; received Keppra) presenting with another withdrawal seizure, placed on CIWA protocol with elevated CIWA scores necessitating addition of phenobarbital as Ativan IVPs have been insufficient to cover the withdrawal symptoms.  RECOMMENDATION:  - agree with ICU Critical care recommendations; if CIWA remains 15 or above, transfer to ICU  - Patient seems to be stable at this time with no acute sxs of severe withdrawal signs so can continue Ativan IVP  protocol as ordered  - Patient has NO CAPACITY at this time and cannot leave AMA  - ordered thiamine PO supplement; maintains fall risk and seizure precaution

## 2019-06-04 NOTE — CONSULT NOTE ADULT - ASSESSMENT
47yo, BF with PMH of Alcohol Use, now in alcohol withdrawal, in agitation  received total of 14mg of ativan, and now 130mg of phenobarbitol, slowly went to sleep  If patient remain CIWA >15, would bring her over to icu for further managment  currently advise to add phenobarbitol 130mg every 2hours prn for CIWA >15

## 2019-06-04 NOTE — BEHAVIORAL HEALTH ASSESSMENT NOTE - OTHER
continued alcohol abuse ISTOP lower end of fair tenuous no visible tremors at rest deferred at this time slight slurring "I guess fine" appropriate to context linear with episodes of confusion unable to tolerate an MMSE at this time to be determined once awake, alert and oriented

## 2019-06-04 NOTE — BEHAVIORAL HEALTH ASSESSMENT NOTE - DESCRIPTION (FIRST USE, LAST USE, QUANTITY, FREQUENCY, DURATION)
years of daily vodka drinking up to 1 pint; cut down on amount and frequency since 2015/16 reportedly but may be under-reporting the actual amount used

## 2019-06-04 NOTE — BEHAVIORAL HEALTH ASSESSMENT NOTE - HPI (INCLUDE ILLNESS QUALITY, SEVERITY, DURATION, TIMING, CONTEXT, MODIFYING FACTORS, ASSOCIATED SIGNS AND SYMPTOMS)
Patient is a 47 yo AAF, , domiciled, BIB EMS from home for a witnessed seizure. + mag 1.0 lactate 6.3; potassium 2.9. Admitted on 6/1/19 - same day noted to be agitated and intoxicated, has removed IV access x 3, incontinent of urine and feces. Today, ICU consulted for CIWA 23; on 6/3/19 received total of Ativan 4mg IM and total of Ativan 20mg IVP. Today, CIWA 23, kicking staff, out of bed, hallucinating, received 1dose of 130mg phenobarbitol     HISTORY: As per chart, 1st seizure in May of 2015 during which time Patient was drinking a pint of vodka daily (seizure described as waking up with no confusion or haziness; no incontinence; + biting tongue; unwitnessed; unknown how long episode lasted). At that time, Patient noted to have elevated ammonia & bilirubin. was icteric and had pancytopenia. Keppra was started, seizure precautions and placed on CIWA protocol Patient is a 49 yo AAF, , domiciled, BIB EMS from home for a witnessed seizure. + mag 1.0 lactate 6.3; potassium 2.9. Admitted on 6/1/19 - same day noted to be agitated and intoxicated, has removed IV access x 3, incontinent of urine and feces. Today, ICU consulted for CIWA 23; on 6/3/19 received total of Ativan 4mg IM and total of Ativan 20mg IVP. Today, CIWA 23, kicking staff, out of bed, hallucinating, received 1dose of 130mg phenobarbitol.     HISTORY: As per chart, 1st seizure in May of 2015 during which time Patient was drinking a pint of vodka daily (seizure described as waking up with no confusion or haziness; no incontinence; + biting tongue; unwitnessed; unknown how long episode lasted). At that time, Patient noted to have elevated ammonia & bilirubin. was icteric and had pancytopenia. Keppra was started, seizure precautions and placed on CIWA protocol    Today, Patient is sitting in bed eating lunch - slight uncoordination with UE but able to feed herself successfully. heavy lidded as expected with slight slurring of words but able to provide linear statements with some episodes of disorientation (expected). + not oriented to time. Patient reports that this is her second seizure and she has not taken the Keppra for years and is not following up with a Neurologist. Patient reports that she is still drinking but cut down the frequency and amount of her consumption to 3-4 x/ week on average 3 mixed vodka drinks. She has not attended any substance abuse counseling/services/therapy/detox/rehab.     ISTOP Reference #: [id number]; no record found of Patient

## 2019-06-04 NOTE — CONSULT NOTE ADULT - SUBJECTIVE AND OBJECTIVE BOX
Patient is a 48y old  Female who presents with a chief complaint of Seizure (2019 13:02)        49yo, BF with PMH of Alcohol Use, otherwise denies other medical issues, presents with Sz today and yesterday.  Patient  has had prior seizure in past as well but not on any meds - states may have been related to not drinking alcohol. Pt states last drink was 2 days ago. Unable to give amount of daily use.  Patient had sz in ER.  Denies CP, SOB, cough, palpitation, n/v/d, fever, chills, weakness, abdominal pain, dysuria. (2019 19:04)    ICU consulted for CIWA 23, patient is on standing ativan taper, then has been getting 2mg ativan every 1hour total of 14mg ativan since last night 7pm.  At 19 04:30 CIWA 23, kicking staff, out of bed, hallucinating, received 1dose of 130mg phenobarbitol          PAST MEDICAL & SURGICAL HISTORY:  Alcohol abuse  No significant past surgical history    FAMILY HISTORY:  Family history of diabetes mellitus (Mother)    Social History: Allergies    No Known Allergies    Intolerances        MEDICATIONS  (STANDING):  folic acid 1 milliGRAM(s) Oral daily  heparin  Injectable 5000 Unit(s) SubCutaneous every 12 hours  LORazepam   Injectable 3 milliGRAM(s) IV Push every 4 hours  LORazepam   Injectable   IV Push   multivitamin 1 Tablet(s) Oral daily  pantoprazole    Tablet 40 milliGRAM(s) Oral before breakfast  PHENobarbital Injectable 130 milliGRAM(s) IV Push once  sodium chloride 0.9% 1000 milliLiter(s) (80 mL/Hr) IV Continuous <Continuous>  sodium chloride 0.9%. 1000 milliLiter(s) (80 mL/Hr) IV Continuous <Continuous>  thiamine 100 milliGRAM(s) Oral daily    MEDICATIONS  (PRN):  acetaminophen   Tablet .. 650 milliGRAM(s) Oral every 6 hours PRN Mild Pain (1 - 3)  LORazepam   Injectable 2 milliGRAM(s) IV Push every 1 hour PRN CIWA > 7      REVIEW OF SYSTEMS:    CONSTITUTIONAL: No fever, weight loss, or fatigue  EYES: No eye pain, visual disturbances, or discharge  ENMT:  No difficulty hearing, tinnitus, vertigo; No sinus or throat pain  NECK: No pain or stiffness  BREASTS: No pain, masses, or nipple discharge  RESPIRATORY: No cough, wheezing, chills or hemoptysis; No shortness of breath  CARDIOVASCULAR: No chest pain, palpitations, dizziness, or leg swelling  GASTROINTESTINAL: No abdominal or epigastric pain. No nausea, vomiting, or hematemesis; No diarrhea or constipation. No melena or hematochezia.  GENITOURINARY: No dysuria, frequency, hematuria, or incontinence  NEUROLOGICAL: No headaches, memory loss, loss of strength, numbness, or tremors  SKIN: No itching, burning, rashes, or lesions   LYMPH NODES: No enlarged glands  ENDOCRINE: No heat or cold intolerance; No hair loss  MUSCULOSKELETAL: No joint pain or swelling; No muscle, back, or extremity pain  PSYCHIATRIC: No depression, anxiety, mood swings, or difficulty sleeping  HEME/LYMPH: No easy bruising, or bleeding gums  ALLERGY AND IMMUNOLOGIC: No hives or eczema      PHYSICAL EXAM:    T(C): 37.1 (2019 00:25), Max: 37.1 (2019 00:25)  T(F): 98.7 (2019 00:25), Max: 98.7 (2019 00:25)  HR: 87 (2019 00:25) (78 - 91)  BP: 154/80 (2019 00:25) (135/96 - 156/94)  RR: 18 (2019 00:25) (18 - 20)  SpO2: 100% (2019 00:25) (98% - 100%)    GENERAL: NAD, well-groomed, well-developed  HEAD:  Atraumatic, Normocephalic  EYES: EOMI, PERRLA, conjunctiva and sclera clear  NECK: Supple, No JVD, Normal thyroid  NERVOUS SYSTEM:  Alert & Oriented X2  Motor Strength 5/5 B/L upper and lower extremities; DTRs 2+ intact and symmetric  CHEST/LUNG: CTA bilaterally; No rales, rhonchi, wheezing, or rubs  HEART: Regular rate and rhythm; No murmurs, rubs, or gallops  ABDOMEN: Soft, Nontender, Nondistended; Bowel sounds present  EXTREMITIES:  2+ Peripheral Pulses, No clubbing, cyanosis, or edema  SKIN: No rashes or lesions        LABS:                        9.8    4.99  )-----------( 189      ( 2019 07:50 )             31.8     06-03    137  |  103  |  9   ----------------------------<  92  3.3<L>   |  28  |  0.60    Ca    9.5      2019 07:50  Phos  3.3     06-02  Mg     1.7     06-    TPro  7.4  /  Alb  3.4  /  TBili  0.7  /  DBili  x   /  AST  69<H>  /  ALT  49  /  AlkPhos  63  06-03      Urinalysis Basic - ( 2019 06:52 )    Color: [place] / Appearance: Slightly Turbid / S.020 / pH: x  Gluc: x / Ketone: Small  / Bili: Small / Urobili: 4 mg/dL   Blood: x / Protein: 500 mg/dL / Nitrite: Negative   Leuk Esterase: Trace / RBC: 3-5 /HPF / WBC 3-5   Sq Epi: x / Non Sq Epi: Moderate / Bacteria: Moderate                    CRITICAL CARE TIME SPENT: 35min

## 2019-06-05 LAB
ALBUMIN SERPL ELPH-MCNC: 3.3 G/DL — SIGNIFICANT CHANGE UP (ref 3.3–5)
ALP SERPL-CCNC: 62 U/L — SIGNIFICANT CHANGE UP (ref 40–120)
ALT FLD-CCNC: 41 U/L — SIGNIFICANT CHANGE UP (ref 12–78)
ANION GAP SERPL CALC-SCNC: 8 MMOL/L — SIGNIFICANT CHANGE UP (ref 5–17)
AST SERPL-CCNC: 40 U/L — HIGH (ref 15–37)
BILIRUB SERPL-MCNC: 0.7 MG/DL — SIGNIFICANT CHANGE UP (ref 0.2–1.2)
BUN SERPL-MCNC: 6 MG/DL — LOW (ref 7–23)
CALCIUM SERPL-MCNC: 9.3 MG/DL — SIGNIFICANT CHANGE UP (ref 8.5–10.1)
CHLORIDE SERPL-SCNC: 105 MMOL/L — SIGNIFICANT CHANGE UP (ref 96–108)
CO2 SERPL-SCNC: 26 MMOL/L — SIGNIFICANT CHANGE UP (ref 22–31)
CREAT SERPL-MCNC: 0.57 MG/DL — SIGNIFICANT CHANGE UP (ref 0.5–1.3)
GLUCOSE SERPL-MCNC: 86 MG/DL — SIGNIFICANT CHANGE UP (ref 70–99)
HCT VFR BLD CALC: 33 % — LOW (ref 34.5–45)
HGB BLD-MCNC: 9.8 G/DL — LOW (ref 11.5–15.5)
MAGNESIUM SERPL-MCNC: 2 MG/DL — SIGNIFICANT CHANGE UP (ref 1.6–2.6)
MCHC RBC-ENTMCNC: 23.1 PG — LOW (ref 27–34)
MCHC RBC-ENTMCNC: 29.7 GM/DL — LOW (ref 32–36)
MCV RBC AUTO: 77.6 FL — LOW (ref 80–100)
NRBC # BLD: 0 /100 WBCS — SIGNIFICANT CHANGE UP (ref 0–0)
PLATELET # BLD AUTO: 185 K/UL — SIGNIFICANT CHANGE UP (ref 150–400)
POTASSIUM SERPL-MCNC: 3.9 MMOL/L — SIGNIFICANT CHANGE UP (ref 3.5–5.3)
POTASSIUM SERPL-SCNC: 3.9 MMOL/L — SIGNIFICANT CHANGE UP (ref 3.5–5.3)
PROT SERPL-MCNC: 7.5 GM/DL — SIGNIFICANT CHANGE UP (ref 6–8.3)
RBC # BLD: 4.25 M/UL — SIGNIFICANT CHANGE UP (ref 3.8–5.2)
RBC # FLD: 19.9 % — HIGH (ref 10.3–14.5)
SODIUM SERPL-SCNC: 139 MMOL/L — SIGNIFICANT CHANGE UP (ref 135–145)
WBC # BLD: 3.6 K/UL — LOW (ref 3.8–10.5)
WBC # FLD AUTO: 3.6 K/UL — LOW (ref 3.8–10.5)

## 2019-06-05 PROCEDURE — 99233 SBSQ HOSP IP/OBS HIGH 50: CPT

## 2019-06-05 RX ORDER — PHENOBARBITAL 60 MG
130 TABLET ORAL ONCE
Refills: 0 | Status: DISCONTINUED | OUTPATIENT
Start: 2019-06-05 | End: 2019-06-05

## 2019-06-05 RX ORDER — DEXMEDETOMIDINE HYDROCHLORIDE IN 0.9% SODIUM CHLORIDE 4 UG/ML
0.2 INJECTION INTRAVENOUS
Qty: 200 | Refills: 0 | Status: DISCONTINUED | OUTPATIENT
Start: 2019-06-05 | End: 2019-06-09

## 2019-06-05 RX ORDER — PHENOBARBITAL 60 MG
260 TABLET ORAL
Refills: 0 | Status: DISCONTINUED | OUTPATIENT
Start: 2019-06-05 | End: 2019-06-08

## 2019-06-05 RX ORDER — PHENOBARBITAL 60 MG
130 TABLET ORAL EVERY 6 HOURS
Refills: 0 | Status: DISCONTINUED | OUTPATIENT
Start: 2019-06-05 | End: 2019-06-06

## 2019-06-05 RX ORDER — PHENOBARBITAL 60 MG
130 TABLET ORAL
Refills: 0 | Status: DISCONTINUED | OUTPATIENT
Start: 2019-06-05 | End: 2019-06-06

## 2019-06-05 RX ADMIN — Medication 1 TABLET(S): at 10:11

## 2019-06-05 RX ADMIN — Medication 400 MILLIGRAM(S): at 11:00

## 2019-06-05 RX ADMIN — HEPARIN SODIUM 5000 UNIT(S): 5000 INJECTION INTRAVENOUS; SUBCUTANEOUS at 05:13

## 2019-06-05 RX ADMIN — PANTOPRAZOLE SODIUM 40 MILLIGRAM(S): 20 TABLET, DELAYED RELEASE ORAL at 05:12

## 2019-06-05 RX ADMIN — Medication 3 MILLIGRAM(S): at 01:38

## 2019-06-05 RX ADMIN — Medication 1 MILLIGRAM(S): at 10:12

## 2019-06-05 RX ADMIN — HEPARIN SODIUM 5000 UNIT(S): 5000 INJECTION INTRAVENOUS; SUBCUTANEOUS at 18:54

## 2019-06-05 RX ADMIN — Medication 2 MILLIGRAM(S): at 10:12

## 2019-06-05 RX ADMIN — Medication 2 MILLIGRAM(S): at 05:52

## 2019-06-05 RX ADMIN — Medication 2 MILLIGRAM(S): at 13:56

## 2019-06-05 RX ADMIN — Medication 400 MILLIGRAM(S): at 10:11

## 2019-06-05 RX ADMIN — Medication 100 MILLIGRAM(S): at 10:11

## 2019-06-05 RX ADMIN — DEXMEDETOMIDINE HYDROCHLORIDE IN 0.9% SODIUM CHLORIDE 3.65 MICROGRAM(S)/KG/HR: 4 INJECTION INTRAVENOUS at 22:05

## 2019-06-05 RX ADMIN — Medication 130 MILLIGRAM(S): at 18:55

## 2019-06-05 RX ADMIN — Medication 2 MILLIGRAM(S): at 05:13

## 2019-06-05 RX ADMIN — Medication 400 MILLIGRAM(S): at 15:29

## 2019-06-05 RX ADMIN — Medication 130 MILLIGRAM(S): at 14:18

## 2019-06-05 RX ADMIN — Medication 2 MILLIGRAM(S): at 13:57

## 2019-06-05 NOTE — CONSULT NOTE ADULT - ATTENDING COMMENTS
agree with above
49 yo female with h/o etoh abuse admitted for withdrawal seizures.  Course of hospitalization complicated by multiple RRTs/code gray for acute agitation +/- elevated CIWA scores.  Pt to be transferred to ICU for high dose phenobarb with precedex infusion. Patient noted to be delirious and agitated on the floor.  Given 1 time dose of phenobarb with minimal effect.  Will transfer to ICU for further management ETOH delirium    GEN - disoriented, agitated pulling at EKG leads  CARD -s1s2, RRR, no M,G,R;   PULM - CTA b/l, symmetric breath sounds;   ABD:  +BS, ND, NT, soft, no guarding, no rebound, no masses;   BACK: no CVA tenderness, Normal  spine;   EXT: symmetric pulses, 2+ dp, capillary refill < 2 seconds, no clubbing, no cyanosis, no edema  Neuro: moving all extremities, no focal deficits                          9.8    3.60  )-----------( 185      ( 05 Jun 2019 07:31 )             33.0   139  |  105  |  6<L>  ----------------------------<  86  3.9   |  26  |  0.57    Ca    9.3      05 Jun 2019 07:31  Mg     2.0     06-05  TPro  7.5  /  Alb  3.3  /  TBili  0.7  /  DBili  x   /  AST  40<H>  /  ALT  41  /  AlkPhos  62  06-05    49 yo female with h/o etoh abuse admitted for withdrawal seizures.  Course of hospitalization complicated by multiple RRTs/code gray for acute agitation +/- elevated CIWA scores.  Pt to be transferred to ICU for high dose phenobarb with precedex infusion. Patient noted to be delirious and agitated on the floor.  Given 1 time dose of phenobarb with minimal effect.  Will transfer to ICU for further management ETOH delirium    ETOH withdrawal  - Continue with mvi, thiamine, folic acid  - Start phenobarb 130 Q6H, for agitation can give phenobarb 130mg q15min until calm; phenobarb 260mg IVP for severe agitation and/or CIWA >20  - Precedex to RASS -1-0  - seizure precautions    Prolonged QT - hold any haldol, repeat ekg daily, correct electrolyte abnormalities, K>4 and Mg>2    GERD - Continue protonix  Gen: HSQ for dvt prophylaxis

## 2019-06-05 NOTE — CONSULT NOTE ADULT - ASSESSMENT
47 yo female with h/o etoh abuse admitted for withdrawal seizures.  Course of hospitalization complicated by multiple RRTs/code gray for acute agitation +/- elevated CIWA scores.  Pt to be transferred to ICU for high dose phenobarb with precedex infusion.

## 2019-06-05 NOTE — CONSULT NOTE ADULT - PROBLEM SELECTOR RECOMMENDATION 9
transfer to ICU for phenobarb and precedex infusion..  Will closely monitor qt interval for prolongation.  continue mvi, thiamine, folic acid.

## 2019-06-05 NOTE — PROGRESS NOTE BEHAVIORAL HEALTH - SUMMARY
49 yo female with charted hx of Alcohol Abuse (continuous, daily drinking pattern), with hx of withdrawal seizures (onset 05/15; received Keppra) presenting with another withdrawal seizure, placed on CIWA protocol with elevated CIWA scores necessitating addition of phenobarbital as Ativan IVPs have been insufficient to cover the withdrawal symptoms as of 6/3-6/4. Patient has been doing better since then and did not need ICU admission.   NEW / ADDITIONAL RECOMMENDATIONS:  - would change Ativan IVP PRN parameter to "for CIWA > 8"  - agree with ICU Critical care recommendations; if CIWA remains 15 or above, transfer to ICU  - Patient would be best served by transfer to alcohol detox/rehab once medically cleared 47 yo female with charted hx of Alcohol Abuse (continuous, daily drinking pattern), with hx of withdrawal seizures (onset 05/15; received Keppra) presenting with another withdrawal seizure, placed on CIWA protocol with elevated CIWA scores necessitating addition of phenobarbital as Ativan IVPs have been insufficient to cover the withdrawal symptoms as of 6/3-6/4. Patient has been doing better since then and did not need ICU admission.   NEW / ADDITIONAL RECOMMENDATIONS:  - would change Ativan IVP PRN parameter to "for CIWA > 8"; would use phenobarb as the primary agent at this time instead of Ativan   - agree with ICU Critical care recommendations/plan of care   - once she regains capacity, Patient would be best served by transfer to alcohol rehab once medically cleared

## 2019-06-05 NOTE — PROGRESS NOTE BEHAVIORAL HEALTH - NSBHFUPINTERVALHXFT_PSY_A_CORE
Patient attempted to leave Unit 2D and a Code Grey was called. Patient received IVP phenobarb & benzo - remained awake and sitting on edge of her bed. patient was again examined - confused, disoriented and making illogical statements such as asking for her shoes so "I can climb the building" and asking "why do men touch their daughters?" Unable demonstrate understanding, appreciation, reasoning required for capacity.

## 2019-06-05 NOTE — PROGRESS NOTE BEHAVIORAL HEALTH - OTHER
deferred at this time slight slurring "I guess fine" appropriate to context linear with episodes of confusion unable to tolerate an MMSE at this time lower end of fair tenuous no visible tremors at rest to be determined once awake, alert and oriented superficially cooperative limited "let me go home!"

## 2019-06-05 NOTE — PROGRESS NOTE ADULT - SUBJECTIVE AND OBJECTIVE BOX
Patient is a 48y old  Female who presents with a chief complaint of Seizure (04 Jun 2019 13:52)      SUBJECTIVE/OBJECTIVE:    Not agitated.    MEDICATIONS  (STANDING):  folic acid 1 milliGRAM(s) Oral daily  heparin  Injectable 5000 Unit(s) SubCutaneous every 12 hours  LORazepam   Injectable   IV Push   LORazepam   Injectable 2 milliGRAM(s) IV Push every 4 hours  multivitamin 1 Tablet(s) Oral daily  pantoprazole    Tablet 40 milliGRAM(s) Oral before breakfast  sodium chloride 0.9% 1000 milliLiter(s) (80 mL/Hr) IV Continuous <Continuous>  sodium chloride 0.9%. 1000 milliLiter(s) (80 mL/Hr) IV Continuous <Continuous>  thiamine 100 milliGRAM(s) Oral daily    MEDICATIONS  (PRN):  acetaminophen   Tablet .. 650 milliGRAM(s) Oral every 6 hours PRN Mild Pain (1 - 3)  ibuprofen  Tablet. 400 milliGRAM(s) Oral every 6 hours PRN Moderate Pain (4 - 6)  LORazepam   Injectable 2 milliGRAM(s) IV Push every 1 hour PRN CIWA > 7      Allergies    No Known Allergies    Intolerances          Vital Signs Last 24 Hrs  T(C): 36.3 (05 Jun 2019 05:33), Max: 36.6 (04 Jun 2019 17:30)  T(F): 97.3 (05 Jun 2019 05:33), Max: 97.8 (04 Jun 2019 17:30)  HR: 62 (05 Jun 2019 05:33) (62 - 92)  BP: 147/77 (05 Jun 2019 05:33) (124/69 - 156/91)  BP(mean): --  RR: 18 (05 Jun 2019 05:33) (17 - 20)  SpO2: 100% (05 Jun 2019 05:33) (95% - 100%)        PHYSICAL EXAM:  GENERAL: NAD, well-groomed, well-developed  HEAD:  Atraumatic, Normocephalic  EYES: EOMI, PERRLA, conjunctiva and sclera clear  ENMT: No tonsillar erythema, exudates, or enlargement; Moist mucous membranes, No lesions  NECK: Supple, No JVD, Normal thyroid  NERVOUS SYSTEM:  Awake;  Motor Strength 5/5 B/L; No tremor.  CHEST/LUNG: Clear bilaterally; No rales, rhonchi, wheezing, or rubs  HEART: Regular rate and rhythm; No murmurs, rubs, or gallops  ABDOMEN: Soft, Nontender, Nondistended; Bowel sounds present  EXTREMITIES:  2+ Peripheral Pulses, No clubbing, cyanosis, or edema  LYMPH: No lymphadenopathy noted  SKIN: No rashes or lesions          LABS:                        9.8    3.60  )-----------( 185      ( 05 Jun 2019 07:31 )             33.0     06-05    139  |  105  |  6<L>  ----------------------------<  86  3.9   |  26  |  0.57    Ca    9.3      05 Jun 2019 07:31  Mg     1.5     06-04    TPro  7.5  /  Alb  3.3  /  TBili  0.7  /  DBili  x   /  AST  40<H>  /  ALT  41  /  AlkPhos  62  06-05        CAPILLARY BLOOD GLUCOSE              RADIOLOGY & ADDITIONAL TESTS:        Consultant(s) Notes Reviewed:  [ ] YES  [ ] NO

## 2019-06-05 NOTE — CONSULT NOTE ADULT - SUBJECTIVE AND OBJECTIVE BOX
Patient is a 48y old  Female who presents with a chief complaint of Seizure (2019 10:37)      HPI:  47yo, BF with PMH of Alcohol Use, otherwise denies other medical issues, presents with Sz today and yesterday.  Patient  has had prior seizure in past as well but not on any meds - states may have been related to not drinking alcohol. Pt states last drink was 2 days ago. Unable to give amount of daily use.  Patient had sz in ER.  Denies CP, SOB, cough, palpitation, n/v/d, fever, chills, weakness, abdominal pain, dysuria. (2019 19:04)      Allergies    No Known Allergies    Intolerances        MEDICATIONS  (STANDING):  dexmedetomidine Infusion 0.2 MICROgram(s)/kG/Hr (3.655 mL/Hr) IV Continuous <Continuous>  folic acid 1 milliGRAM(s) Oral daily  heparin  Injectable 5000 Unit(s) SubCutaneous every 12 hours  multivitamin 1 Tablet(s) Oral daily  pantoprazole    Tablet 40 milliGRAM(s) Oral before breakfast  PHENobarbital Injectable 130 milliGRAM(s) IV Push every 6 hours  PHENobarbital Injectable 130 milliGRAM(s) IV Push once  sodium chloride 0.9% 1000 milliLiter(s) (80 mL/Hr) IV Continuous <Continuous>  thiamine 100 milliGRAM(s) Oral daily    MEDICATIONS  (PRN):  acetaminophen   Tablet .. 650 milliGRAM(s) Oral every 6 hours PRN Mild Pain (1 - 3)  ibuprofen  Tablet. 400 milliGRAM(s) Oral every 6 hours PRN Moderate Pain (4 - 6)      Daily     Daily Weight in k.5 (2019 05:33)    Drug Dosing Weight    Weight (kg): 73.1 (2019 21:26)    PAST MEDICAL & SURGICAL HISTORY:  Alcohol abuse  No significant past surgical history      FAMILY HISTORY:  Family history of diabetes mellitus (Mother)      SOCIAL HISTORY:    ADVANCE DIRECTIVES:    REVIEW OF SYSTEMS:    CONSTITUTIONAL: No fever, weight loss, or fatigue  EYES: No eye pain, visual disturbances, or discharge  ENMT:  No difficulty hearing, tinnitus, vertigo; No sinus or throat pain  NECK: No pain or stiffness  BREASTS: No pain, masses, or nipple discharge  RESPIRATORY: No cough, wheezing, chills or hemoptysis; No shortness of breath  CARDIOVASCULAR: No chest pain, palpitations, dizziness, or leg swelling  GASTROINTESTINAL: No abdominal or epigastric pain. No nausea, vomiting, or hematemesis; No diarrhea or constipation. No melena or hematochezia.  GENITOURINARY: No dysuria, frequency, hematuria, or incontinence  NEUROLOGICAL: No headaches, memory loss, loss of strength, numbness, or tremors  SKIN: No itching, burning, rashes, or lesions   LYMPH NODES: No enlarged glands  ENDOCRINE: No heat or cold intolerance; No hair loss  MUSCULOSKELETAL: No joint pain or swelling; No muscle, back, or extremity pain  PSYCHIATRIC: No depression, anxiety, mood swings, or difficulty sleeping  HEME/LYMPH: No easy bruising, or bleeding gums  ALLERGY AND IMMUNOLOGIC: No hives or eczema          ICU Vital Signs Last 24 Hrs  T(C): 36 (2019 11:03), Max: 36.6 (2019 17:30)  T(F): 96.8 (2019 11:03), Max: 97.8 (2019 17:30)  HR: 84 (2019 11:03) (62 - 85)  BP: 116/79 (2019 11:03) (116/79 - 156/91)  BP(mean): --  ABP: --  ABP(mean): --  RR: 16 (2019 11:03) (16 - 18)  SpO2: 100% (2019 11:03) (95% - 100%)          I&O's Detail    2019 07:  -  2019 07:00  --------------------------------------------------------  IN:    Oral Fluid: 240 mL    sodium chloride 0.9%: 1920 mL  Total IN: 2160 mL    OUT:    Voided: 400 mL  Total OUT: 400 mL    Total NET: 1760 mL      2019 07:  -  2019 14:57  --------------------------------------------------------  IN:    Oral Fluid: 120 mL  Total IN: 120 mL    OUT:  Total OUT: 0 mL    Total NET: 120 mL          PHYSICAL EXAM:    GENERAL: NAD, well-groomed, well-developed  HEAD:  Atraumatic, Normocephalic  EYES: EOMI, PERRLA, conjunctiva and sclera clear  ENMT: No tonsillar erythema, exudates, or enlargement; Moist mucous membranes, Good dentition, No lesions  NECK: Supple, No JVD, Normal thyroid  NERVOUS SYSTEM:  Alert & Oriented X3, Good concentration; Motor Strength 5/5 B/L upper and lower extremities; DTRs 2+ intact and symmetric  CHEST/LUNG: Clear to percussion bilaterally; No rales, rhonchi, wheezing, or rubs  HEART: Regular rate and rhythm; No murmurs, rubs, or gallops  ABDOMEN: Soft, Nontender, Nondistended; Bowel sounds present  EXTREMITIES:  2+ Peripheral Pulses, No clubbing, cyanosis, or edema  LYMPH: No lymphadenopathy noted  SKIN: No rashes or lesions    LABS:  CBC Full  -  ( 2019 07:31 )  WBC Count : 3.60 K/uL  RBC Count : 4.25 M/uL  Hemoglobin : 9.8 g/dL  Hematocrit : 33.0 %  Platelet Count - Automated : 185 K/uL  Mean Cell Volume : 77.6 fl  Mean Cell Hemoglobin : 23.1 pg  Mean Cell Hemoglobin Concentration : 29.7 gm/dL  Auto Neutrophil # : x  Auto Lymphocyte # : x  Auto Monocyte # : x  Auto Eosinophil # : x  Auto Basophil # : x  Auto Neutrophil % : x  Auto Lymphocyte % : x  Auto Monocyte % : x  Auto Eosinophil % : x  Auto Basophil % : x    06-05    139  |  105  |  6<L>  ----------------------------<  86  3.9   |  26  |  0.57    Ca    9.3      2019 07:31  Mg     2.0     06-05    TPro  7.5  /  Alb  3.3  /  TBili  0.7  /  DBili  x   /  AST  40<H>  /  ALT  41  /  AlkPhos  62  06-05    CAPILLARY BLOOD GLUCOSE                    EKG:    ECHO, US:    RADIOLOGY:    CRITICAL CARE TIME SPENT:

## 2019-06-06 LAB
ALBUMIN SERPL ELPH-MCNC: 3.1 G/DL — LOW (ref 3.3–5)
ALP SERPL-CCNC: 70 U/L — SIGNIFICANT CHANGE UP (ref 40–120)
ALT FLD-CCNC: 38 U/L — SIGNIFICANT CHANGE UP (ref 12–78)
ANION GAP SERPL CALC-SCNC: 9 MMOL/L — SIGNIFICANT CHANGE UP (ref 5–17)
AST SERPL-CCNC: 32 U/L — SIGNIFICANT CHANGE UP (ref 15–37)
BILIRUB SERPL-MCNC: 0.4 MG/DL — SIGNIFICANT CHANGE UP (ref 0.2–1.2)
BUN SERPL-MCNC: 9 MG/DL — SIGNIFICANT CHANGE UP (ref 7–23)
CALCIUM SERPL-MCNC: 9.6 MG/DL — SIGNIFICANT CHANGE UP (ref 8.5–10.1)
CHLORIDE SERPL-SCNC: 105 MMOL/L — SIGNIFICANT CHANGE UP (ref 96–108)
CO2 SERPL-SCNC: 26 MMOL/L — SIGNIFICANT CHANGE UP (ref 22–31)
CREAT SERPL-MCNC: 0.54 MG/DL — SIGNIFICANT CHANGE UP (ref 0.5–1.3)
GLUCOSE BLDC GLUCOMTR-MCNC: 116 MG/DL — HIGH (ref 70–99)
GLUCOSE SERPL-MCNC: 107 MG/DL — HIGH (ref 70–99)
HCT VFR BLD CALC: 34.1 % — LOW (ref 34.5–45)
HGB BLD-MCNC: 10.1 G/DL — LOW (ref 11.5–15.5)
MAGNESIUM SERPL-MCNC: 2.1 MG/DL — SIGNIFICANT CHANGE UP (ref 1.6–2.6)
MCHC RBC-ENTMCNC: 23.1 PG — LOW (ref 27–34)
MCHC RBC-ENTMCNC: 29.6 GM/DL — LOW (ref 32–36)
MCV RBC AUTO: 78 FL — LOW (ref 80–100)
NRBC # BLD: 0 /100 WBCS — SIGNIFICANT CHANGE UP (ref 0–0)
PHOSPHATE SERPL-MCNC: 4 MG/DL — SIGNIFICANT CHANGE UP (ref 2.5–4.5)
PLATELET # BLD AUTO: 176 K/UL — SIGNIFICANT CHANGE UP (ref 150–400)
POTASSIUM SERPL-MCNC: 3.9 MMOL/L — SIGNIFICANT CHANGE UP (ref 3.5–5.3)
POTASSIUM SERPL-SCNC: 3.9 MMOL/L — SIGNIFICANT CHANGE UP (ref 3.5–5.3)
PROT SERPL-MCNC: 7.5 GM/DL — SIGNIFICANT CHANGE UP (ref 6–8.3)
RBC # BLD: 4.37 M/UL — SIGNIFICANT CHANGE UP (ref 3.8–5.2)
RBC # FLD: 19.9 % — HIGH (ref 10.3–14.5)
SODIUM SERPL-SCNC: 140 MMOL/L — SIGNIFICANT CHANGE UP (ref 135–145)
WBC # BLD: 3.42 K/UL — LOW (ref 3.8–10.5)
WBC # FLD AUTO: 3.42 K/UL — LOW (ref 3.8–10.5)

## 2019-06-06 PROCEDURE — 99233 SBSQ HOSP IP/OBS HIGH 50: CPT

## 2019-06-06 PROCEDURE — 93010 ELECTROCARDIOGRAM REPORT: CPT

## 2019-06-06 PROCEDURE — 99231 SBSQ HOSP IP/OBS SF/LOW 25: CPT

## 2019-06-06 RX ORDER — PHENOBARBITAL 60 MG
130 TABLET ORAL EVERY 4 HOURS
Refills: 0 | Status: DISCONTINUED | OUTPATIENT
Start: 2019-06-06 | End: 2019-06-07

## 2019-06-06 RX ORDER — SODIUM CHLORIDE 9 MG/ML
1000 INJECTION INTRAMUSCULAR; INTRAVENOUS; SUBCUTANEOUS ONCE
Refills: 0 | Status: COMPLETED | OUTPATIENT
Start: 2019-06-06 | End: 2019-06-06

## 2019-06-06 RX ADMIN — Medication 100 MILLIGRAM(S): at 12:05

## 2019-06-06 RX ADMIN — Medication 130 MILLIGRAM(S): at 15:58

## 2019-06-06 RX ADMIN — HEPARIN SODIUM 5000 UNIT(S): 5000 INJECTION INTRAVENOUS; SUBCUTANEOUS at 17:43

## 2019-06-06 RX ADMIN — HEPARIN SODIUM 5000 UNIT(S): 5000 INJECTION INTRAVENOUS; SUBCUTANEOUS at 05:38

## 2019-06-06 RX ADMIN — Medication 260 MILLIGRAM(S): at 22:08

## 2019-06-06 RX ADMIN — Medication 1 TABLET(S): at 12:05

## 2019-06-06 RX ADMIN — Medication 130 MILLIGRAM(S): at 17:43

## 2019-06-06 RX ADMIN — Medication 130 MILLIGRAM(S): at 10:30

## 2019-06-06 RX ADMIN — SODIUM CHLORIDE 4000 MILLILITER(S): 9 INJECTION INTRAMUSCULAR; INTRAVENOUS; SUBCUTANEOUS at 19:17

## 2019-06-06 RX ADMIN — Medication 130 MILLIGRAM(S): at 20:51

## 2019-06-06 RX ADMIN — Medication 1 MILLIGRAM(S): at 12:05

## 2019-06-06 RX ADMIN — Medication 130 MILLIGRAM(S): at 20:24

## 2019-06-06 RX ADMIN — Medication 130 MILLIGRAM(S): at 12:05

## 2019-06-06 RX ADMIN — Medication 130 MILLIGRAM(S): at 05:38

## 2019-06-06 RX ADMIN — DEXMEDETOMIDINE HYDROCHLORIDE IN 0.9% SODIUM CHLORIDE 3.65 MICROGRAM(S)/KG/HR: 4 INJECTION INTRAVENOUS at 21:39

## 2019-06-06 RX ADMIN — PANTOPRAZOLE SODIUM 40 MILLIGRAM(S): 20 TABLET, DELAYED RELEASE ORAL at 10:26

## 2019-06-06 RX ADMIN — Medication 130 MILLIGRAM(S): at 10:15

## 2019-06-06 NOTE — PROGRESS NOTE BEHAVIORAL HEALTH - NSBHFUPINTERVALHXFT_PSY_A_CORE
Patient is sleeping in bed - looks calm, no psychomotor agitation, tremors or restlessness noted. Does not awaken o name being called or tactile stimuli which is expected as she is on Precedex and phenobarbital. Patient is sleeping in bed - looks calm, no psychomotor agitation, tremors or restlessness noted. Does not awaken to name being called or tactile stimuli which is expected as she is on Precedex and phenobarbital.

## 2019-06-06 NOTE — PROVIDER CONTACT NOTE (EICU) - BACKGROUND
elerted by bedside RN for low BPs 68 systolic, ordered NS bolus x 1 , repeat SBP is in 80s patient is awake, alert. elerted by bedside RN for low BPs 68 systolic, ordered NS bolus x 1 , repeat SBP is in 80s patient is awake, alert. D/W bedside ACP.

## 2019-06-06 NOTE — DIETITIAN INITIAL EVALUATION ADULT. - PERTINENT MEDS FT
acetaminophen   Tablet .. PRN  dexmedetomidine Infusion  folic acid  heparin  Injectable  ibuprofen  Tablet. PRN  multivitamin  pantoprazole    Tablet  PHENobarbital Injectable  PHENobarbital Injectable PRN  PHENobarbital Injectable PRN  thiamine

## 2019-06-06 NOTE — PROGRESS NOTE BEHAVIORAL HEALTH - SUMMARY
- continue alcohol withdrawal management as per ICU team  - will re-evaluate Patient once she is awake, alert and able to participate/tolerate a psychiatric assessment , until that time, she cannot leave AMA given her lack of capacity thus far

## 2019-06-06 NOTE — DIETITIAN INITIAL EVALUATION ADULT. - OTHER INFO
Pt seen for ICU admission. Unable to obtain wt & diet hx due to ETOHA with alcohol withdrawal & seizures on Ativan currently lethargic.  Pt consuming 25-40% most meals due to agitation. Observed 0% lunch today due to pt lethargic. Pt with <50% nutritional needs x 5 days & presents with wt loss 2kg(3%) x 5 days. Last BM x 1(6/2). UBW obtained from previous hospitalization in EMR.

## 2019-06-06 NOTE — PROGRESS NOTE ADULT - SUBJECTIVE AND OBJECTIVE BOX
INTERVAL HPI/OVERNIGHT EVENTS:      49 yo female with h/o etoh abuse admitted for withdrawal seizures.  Course of hospitalization complicated by multiple RRTs/code gray for acute agitation +/- elevated CIWA scores.  Pt to be transferred to ICU for high dose phenobarb with precedex infusion. Patient noted to be delirious and agitated on the floor.  Given 1 time dose of phenobarb with minimal effect.  Will transfer to ICU for further management ETOH delirium    24 hour events: Required phenobarb 260IVP for agitation this AM.  Remains on precedex.      CENTRAL LINE: [ ] YES [x ] NO  LOCATION:   DATE INSERTED:  REMOVE: [ ] YES [ ] NO  EXPLAIN:    UREÑA: [ ] YES [ x] NO    DATE INSERTED:  REMOVE:  [ ] YES [ ] NO  EXPLAIN:    A-LINE:  [ ] YES [x ] NO  LOCATION:   DATE INSERTED:  REMOVE:  [ ] YES [ ] NO  EXPLAIN:    REVIEW OF SYSTEMS: [x] Unable to obtain because: delirium     ICU Vital Signs Last 24 Hrs  T(C): 36.6 (06 Jun 2019 15:34), Max: 36.6 (06 Jun 2019 15:34)  T(F): 97.9 (06 Jun 2019 15:34), Max: 97.9 (06 Jun 2019 15:34)  HR: 64 (06 Jun 2019 14:00) (0 - 67)  BP: 129/78 (06 Jun 2019 14:00) (96/57 - 166/96)  BP(mean): 89 (06 Jun 2019 14:00) (65 - 110)  ABP: --  ABP(mean): --  RR: 14 (06 Jun 2019 14:00) (10 - 19)  SpO2: 100% (06 Jun 2019 14:00) (89% - 100%)      I&O's Detail    05 Jun 2019 07:01  -  06 Jun 2019 07:00  --------------------------------------------------------  IN:    dexmedetomidine Infusion: 85.3 mL    Oral Fluid: 120 mL  Total IN: 205.3 mL    OUT:  Total OUT: 0 mL    Total NET: 205.3 mL      06 Jun 2019 07:01  -  06 Jun 2019 15:36  --------------------------------------------------------  IN:    dexmedetomidine Infusion: 23.5 mL    Oral Fluid: 240 mL  Total IN: 263.5 mL    OUT:  Total OUT: 0 mL    Total NET: 263.5 mL              MEDICATIONS  NEURO  Meds: acetaminophen   Tablet .. 650 milliGRAM(s) Oral every 6 hours PRN Mild Pain (1 - 3)  dexmedetomidine Infusion 0.2 MICROgram(s)/kG/Hr (3.655 mL/Hr) IV Continuous <Continuous>  ibuprofen  Tablet. 400 milliGRAM(s) Oral every 6 hours PRN Moderate Pain (4 - 6)  PHENobarbital Injectable 130 milliGRAM(s) IV Push every 6 hours  PHENobarbital Injectable 130 milliGRAM(s) IV Push every 15 minutes PRN ciwa 8-19  PHENobarbital Injectable 260 milliGRAM(s) IV Push every 15 minutes PRN ciwa 20 or greater    RESPIRATORY    Meds:   CARDIOVASCULAR  Meds:   GI/NUTRITION  Meds: pantoprazole    Tablet 40 milliGRAM(s) Oral before breakfast    GENITOURINARY  Meds: folic acid 1 milliGRAM(s) Oral daily  multivitamin 1 Tablet(s) Oral daily  thiamine 100 milliGRAM(s) Oral daily    HEMATOLOGIC  Meds: heparin  Injectable 5000 Unit(s) SubCutaneous every 12 hours    [x] VTE Prophylaxis  INFECTIOUS DISEASES  Meds:   ENDOCRINE  CAPILLARY BLOOD GLUCOSE      POCT Blood Glucose.: 116 mg/dL (06 Jun 2019 00:39)    Meds:   OTHER MEDICATIONS:  :    PHYSICAL EXAM:    GEN - disoriented, agitated pulling at EKG leads  CARD -s1s2, RRR, no M,G,R;   PULM - CTA b/l, symmetric breath sounds;   ABD:  +BS, ND, NT, soft, no guarding, no rebound, no masses;   BACK: no CVA tenderness, Normal  spine;   EXT: symmetric pulses, 2+ dp, capillary refill < 2 seconds, no clubbing, no cyanosis, no edema  Neuro: moving all extremities, no focal deficits    LABS:                        10.1   3.42  )-----------( 176      ( 06 Jun 2019 02:46 )             34.1      06-06    140  |  105  |  9   ----------------------------<  107<H>  3.9   |  26  |  0.54    Ca    9.6      06 Jun 2019 02:46  Phos  4.0     06-06  Mg     2.1     06-06    TPro  7.5  /  Alb  3.1<L>  /  TBili  0.4  /  DBili  x   /  AST  32  /  ALT  38  /  AlkPhos  70  06-06    GLOBAL ISSUE/BEST PRACTICE:  Analgesia: NA  Sedation: Precedex/ Phenobarb  HOB elevation: yes  Stress ulcer prophylaxis: Protonix  VTE prophylaxis: HSQ  Oral Care: Chlorhexidine  Glycemic control: NA  Nutrition: Regular

## 2019-06-06 NOTE — DIETITIAN INITIAL EVALUATION ADULT. - PERTINENT LABORATORY DATA
06-06 Na 140 mmol/L Glu 107 mg/dL<H> K+ 3.9 mmol/L Cr 0.54 mg/dL BUN 9 mg/dL Phos 4.0 mg/dL Alb 3.1 g/dL<L> PAB n/a   Hgb 10.1 g/dL<L> Hct 34.1 %<L> HgA1C n/a    Glucose, Serum: 107 mg/dL <H>

## 2019-06-06 NOTE — DIETITIAN INITIAL EVALUATION ADULT. - PHYSICAL APPEARANCE
overweight/BMI=26.8; +1 gen edema; Nutrition focused physical exam conducted; Subcutaneous fat loss: [WNL ] Orbital fat pads region, [WNL ]Buccal fat region, [WNL ]Triceps region,  [WNL ]Ribs region.  Muscle wasting: [Mild ]Temples region, [WNL ]Clavicle region, [WNL ]Shoulder region, [unable ]Scapula region, [WNL ]Interosseous region,  [Mild ]thigh region, [WNL ]Calf region

## 2019-06-06 NOTE — PROGRESS NOTE BEHAVIORAL HEALTH - OTHER
n/a - in deep sleep has been better since ICU admission no visible tremors at rest deferred at this time looks peaceful unable to tolerate an MMSE at this time unable to ascertain at this time

## 2019-06-06 NOTE — PROGRESS NOTE ADULT - SUBJECTIVE AND OBJECTIVE BOX
Patient is a 48y old  Female who presents with a chief complaint of Seizure (05 Jun 2019 14:56)      SUBJECTIVE/OBJECTIVE:    In ICU  Event noted; DT    MEDICATIONS  (STANDING):  dexmedetomidine Infusion 0.2 MICROgram(s)/kG/Hr (3.655 mL/Hr) IV Continuous <Continuous>  folic acid 1 milliGRAM(s) Oral daily  heparin  Injectable 5000 Unit(s) SubCutaneous every 12 hours  multivitamin 1 Tablet(s) Oral daily  pantoprazole    Tablet 40 milliGRAM(s) Oral before breakfast  PHENobarbital Injectable 130 milliGRAM(s) IV Push every 6 hours  thiamine 100 milliGRAM(s) Oral daily    MEDICATIONS  (PRN):  acetaminophen   Tablet .. 650 milliGRAM(s) Oral every 6 hours PRN Mild Pain (1 - 3)  ibuprofen  Tablet. 400 milliGRAM(s) Oral every 6 hours PRN Moderate Pain (4 - 6)  PHENobarbital Injectable 130 milliGRAM(s) IV Push every 15 minutes PRN ciwa 8-19  PHENobarbital Injectable 260 milliGRAM(s) IV Push every 15 minutes PRN ciwa 20 or greater      Allergies    No Known Allergies    Intolerances          Vital Signs Last 24 Hrs  T(C): 36.2 (06 Jun 2019 07:15), Max: 36.6 (05 Jun 2019 15:35)  T(F): 97.2 (06 Jun 2019 07:15), Max: 97.9 (05 Jun 2019 15:35)  HR: 64 (06 Jun 2019 11:30) (0 - 67)  BP: 113/64 (06 Jun 2019 11:30) (96/57 - 166/96)  BP(mean): 74 (06 Jun 2019 11:30) (65 - 110)  RR: 15 (06 Jun 2019 11:30) (10 - 19)  SpO2: 100% (06 Jun 2019 11:30) (89% - 100%)    PHYSICAL EXAM:  GENERAL: NAD,   HEAD:  Atraumatic, Normocephalic  EYES: EOMI, PERRLA, conjunctiva and sclera clear  ENMT: No tonsillar erythema, exudates, or enlargement; Moist mucous membranes, No lesions  NECK: Supple, No JVD, Normal thyroid  NERVOUS SYSTEM:  Sedated; Motor Strength 5/5 B/L  CHEST/LUNG: Clear bilaterally; No rales, rhonchi, wheezing, or rubs  HEART: Regular rate and rhythm; No murmurs, rubs, or gallops  ABDOMEN: Soft, Nontender, Nondistended; Bowel sounds present  EXTREMITIES:  2+ Peripheral Pulses, No clubbing, cyanosis, or edema  LYMPH: No lymphadenopathy noted  SKIN: No rashes or lesions    LABS:                        10.1   3.42  )-----------( 176      ( 06 Jun 2019 02:46 )             34.1     06-06    140  |  105  |  9   ----------------------------<  107<H>  3.9   |  26  |  0.54    Ca    9.6      06 Jun 2019 02:46  Phos  4.0     06-06  Mg     2.1     06-06    TPro  7.5  /  Alb  3.1<L>  /  TBili  0.4  /  DBili  x   /  AST  32  /  ALT  38  /  AlkPhos  70  06-06        CAPILLARY BLOOD GLUCOSE      POCT Blood Glucose.: 116 mg/dL (06 Jun 2019 00:39)          RADIOLOGY & ADDITIONAL TESTS:        Consultant(s) Notes Reviewed:  [ xxx] YES  [ ] NO

## 2019-06-06 NOTE — DIETITIAN INITIAL EVALUATION ADULT. - ENERGY NEEDS
5'5",  Wt=71.1kg(6/6/19),  IBW=55kg+/-10%,  124% IBW,   BMI=26.1,  UBW=74.1kg 5'5",  Wt=71.1kg(6/6/19),  IBW=55kg+/-10%,  124% IBW,   BMI=26.1,  UBW=74.1kg,  96% UBW

## 2019-06-07 LAB
ALBUMIN SERPL ELPH-MCNC: 2.9 G/DL — LOW (ref 3.3–5)
ALP SERPL-CCNC: 69 U/L — SIGNIFICANT CHANGE UP (ref 40–120)
ALT FLD-CCNC: 34 U/L — SIGNIFICANT CHANGE UP (ref 12–78)
ANION GAP SERPL CALC-SCNC: 11 MMOL/L — SIGNIFICANT CHANGE UP (ref 5–17)
AST SERPL-CCNC: 28 U/L — SIGNIFICANT CHANGE UP (ref 15–37)
BILIRUB SERPL-MCNC: 0.2 MG/DL — SIGNIFICANT CHANGE UP (ref 0.2–1.2)
BUN SERPL-MCNC: 12 MG/DL — SIGNIFICANT CHANGE UP (ref 7–23)
CALCIUM SERPL-MCNC: 9.5 MG/DL — SIGNIFICANT CHANGE UP (ref 8.5–10.1)
CHLORIDE SERPL-SCNC: 105 MMOL/L — SIGNIFICANT CHANGE UP (ref 96–108)
CO2 SERPL-SCNC: 23 MMOL/L — SIGNIFICANT CHANGE UP (ref 22–31)
CREAT SERPL-MCNC: 0.58 MG/DL — SIGNIFICANT CHANGE UP (ref 0.5–1.3)
GLUCOSE SERPL-MCNC: 72 MG/DL — SIGNIFICANT CHANGE UP (ref 70–99)
HCT VFR BLD CALC: 32.6 % — LOW (ref 34.5–45)
HGB BLD-MCNC: 9.8 G/DL — LOW (ref 11.5–15.5)
MAGNESIUM SERPL-MCNC: 1.8 MG/DL — SIGNIFICANT CHANGE UP (ref 1.6–2.6)
MCHC RBC-ENTMCNC: 23.3 PG — LOW (ref 27–34)
MCHC RBC-ENTMCNC: 30.1 GM/DL — LOW (ref 32–36)
MCV RBC AUTO: 77.6 FL — LOW (ref 80–100)
NRBC # BLD: 0 /100 WBCS — SIGNIFICANT CHANGE UP (ref 0–0)
PHOSPHATE SERPL-MCNC: 4.1 MG/DL — SIGNIFICANT CHANGE UP (ref 2.5–4.5)
PLATELET # BLD AUTO: 204 K/UL — SIGNIFICANT CHANGE UP (ref 150–400)
POTASSIUM SERPL-MCNC: 4 MMOL/L — SIGNIFICANT CHANGE UP (ref 3.5–5.3)
POTASSIUM SERPL-SCNC: 4 MMOL/L — SIGNIFICANT CHANGE UP (ref 3.5–5.3)
PROT SERPL-MCNC: 7.3 GM/DL — SIGNIFICANT CHANGE UP (ref 6–8.3)
RBC # BLD: 4.2 M/UL — SIGNIFICANT CHANGE UP (ref 3.8–5.2)
RBC # FLD: 20 % — HIGH (ref 10.3–14.5)
SODIUM SERPL-SCNC: 139 MMOL/L — SIGNIFICANT CHANGE UP (ref 135–145)
WBC # BLD: 3.26 K/UL — LOW (ref 3.8–10.5)
WBC # FLD AUTO: 3.26 K/UL — LOW (ref 3.8–10.5)

## 2019-06-07 PROCEDURE — 99233 SBSQ HOSP IP/OBS HIGH 50: CPT

## 2019-06-07 RX ORDER — HALOPERIDOL DECANOATE 100 MG/ML
3 INJECTION INTRAMUSCULAR EVERY 6 HOURS
Refills: 0 | Status: COMPLETED | OUTPATIENT
Start: 2019-06-07 | End: 2019-06-08

## 2019-06-07 RX ORDER — PHENOBARBITAL 60 MG
129.6 TABLET ORAL EVERY 4 HOURS
Refills: 0 | Status: DISCONTINUED | OUTPATIENT
Start: 2019-06-07 | End: 2019-06-08

## 2019-06-07 RX ORDER — PHENOBARBITAL 60 MG
TABLET ORAL
Refills: 0 | Status: DISCONTINUED | OUTPATIENT
Start: 2019-06-07 | End: 2019-06-08

## 2019-06-07 RX ORDER — HALOPERIDOL DECANOATE 100 MG/ML
3 INJECTION INTRAMUSCULAR EVERY 8 HOURS
Refills: 0 | Status: COMPLETED | OUTPATIENT
Start: 2019-06-08 | End: 2019-06-09

## 2019-06-07 RX ORDER — HALOPERIDOL DECANOATE 100 MG/ML
5 INJECTION INTRAMUSCULAR ONCE
Refills: 0 | Status: COMPLETED | OUTPATIENT
Start: 2019-06-07 | End: 2019-06-07

## 2019-06-07 RX ORDER — PHENOBARBITAL 60 MG
130 TABLET ORAL ONCE
Refills: 0 | Status: DISCONTINUED | OUTPATIENT
Start: 2019-06-07 | End: 2019-06-07

## 2019-06-07 RX ORDER — PHENOBARBITAL 60 MG
130 TABLET ORAL
Refills: 0 | Status: DISCONTINUED | OUTPATIENT
Start: 2019-06-07 | End: 2019-06-09

## 2019-06-07 RX ORDER — PHENOBARBITAL 60 MG
129.6 TABLET ORAL EVERY 6 HOURS
Refills: 0 | Status: DISCONTINUED | OUTPATIENT
Start: 2019-06-08 | End: 2019-06-08

## 2019-06-07 RX ORDER — HALOPERIDOL DECANOATE 100 MG/ML
3 INJECTION INTRAMUSCULAR EVERY 12 HOURS
Refills: 0 | Status: COMPLETED | OUTPATIENT
Start: 2019-06-10 | End: 2019-06-10

## 2019-06-07 RX ORDER — PHENOBARBITAL 60 MG
129.6 TABLET ORAL EVERY 4 HOURS
Refills: 0 | Status: DISCONTINUED | OUTPATIENT
Start: 2019-06-07 | End: 2019-06-07

## 2019-06-07 RX ADMIN — Medication 129.6 MILLIGRAM(S): at 14:46

## 2019-06-07 RX ADMIN — HEPARIN SODIUM 5000 UNIT(S): 5000 INJECTION INTRAVENOUS; SUBCUTANEOUS at 05:24

## 2019-06-07 RX ADMIN — HALOPERIDOL DECANOATE 3 MILLIGRAM(S): 100 INJECTION INTRAMUSCULAR at 18:28

## 2019-06-07 RX ADMIN — Medication 130 MILLIGRAM(S): at 01:47

## 2019-06-07 RX ADMIN — DEXMEDETOMIDINE HYDROCHLORIDE IN 0.9% SODIUM CHLORIDE 3.65 MICROGRAM(S)/KG/HR: 4 INJECTION INTRAVENOUS at 03:11

## 2019-06-07 RX ADMIN — HEPARIN SODIUM 5000 UNIT(S): 5000 INJECTION INTRAVENOUS; SUBCUTANEOUS at 18:14

## 2019-06-07 RX ADMIN — Medication 1 MILLIGRAM(S): at 12:11

## 2019-06-07 RX ADMIN — Medication 1 TABLET(S): at 12:11

## 2019-06-07 RX ADMIN — Medication 129.6 MILLIGRAM(S): at 11:11

## 2019-06-07 RX ADMIN — HALOPERIDOL DECANOATE 5 MILLIGRAM(S): 100 INJECTION INTRAMUSCULAR at 14:58

## 2019-06-07 RX ADMIN — PANTOPRAZOLE SODIUM 40 MILLIGRAM(S): 20 TABLET, DELAYED RELEASE ORAL at 08:59

## 2019-06-07 RX ADMIN — Medication 100 MILLIGRAM(S): at 12:11

## 2019-06-07 RX ADMIN — Medication 260 MILLIGRAM(S): at 12:34

## 2019-06-07 RX ADMIN — Medication 130 MILLIGRAM(S): at 05:25

## 2019-06-07 RX ADMIN — Medication 129.6 MILLIGRAM(S): at 18:14

## 2019-06-07 NOTE — PROGRESS NOTE BEHAVIORAL HEALTH - NSBHFUPINTERVALHXFT_PSY_A_CORE
Patient continues to have intermittent episodes of agitation / behavioral issues including trying to get up and leave and making aggressive postures towards staff. Patient on exam was sleeping, but awakened and talked about random things such as a man who came and picked her up in his car and drove her around. + confused, disoriented, poor historian at this time. repeat  QTc

## 2019-06-07 NOTE — PROGRESS NOTE BEHAVIORAL HEALTH - OTHER
superficially cooperative intermittent no overt tremors noted deferred at this time soft, long drawn out and slurring unable to articulate / describe looks sedated unable to ascertain at this time with high clinical certainty unable to ascertain at this time unable to tolerate an MMSE at this time

## 2019-06-07 NOTE — PHARMACY COMMUNICATION NOTE - COMMENTS
s/w dr barbosa - wants pt to try phenobarbital 129.6mg PO q4h; md aware we only carry 32.4mg tabs; recommended continuing phenobarbital iv or tapering to phenobarbital 64.8mg as per hospital guidelines, but md wants to try 129.6mg PO q4h and will adjust accordingly

## 2019-06-07 NOTE — PROGRESS NOTE ADULT - SUBJECTIVE AND OBJECTIVE BOX
INTERVAL HPI/OVERNIGHT EVENTS:        49 yo female with h/o etoh abuse admitted for withdrawal seizures.  Course of hospitalization complicated by multiple RRTs/code gray for acute agitation +/- elevated CIWA scores.  Pt to be transferred to ICU for high dose phenobarb with precedex infusion. Patient noted to be delirious and agitated on the floor.  Given 1 time dose of phenobarb with minimal effect.  Will transfer to ICU for further management ETOH delirium    24 hour events: Required phenobarb 260IVP and 130IVP for agitation overnight.  Off precedex overnight.  Started on phenobarb taper starting at q4H dosing.      CENTRAL LINE: [ ] YES [x ] NO  LOCATION:   DATE INSERTED:  REMOVE: [ ] YES [ ] NO  EXPLAIN:    UREÑA: [ ] YES [ x] NO    DATE INSERTED:  REMOVE:  [ ] YES [ ] NO  EXPLAIN:    A-LINE:  [ ] YES [x ] NO  LOCATION:   DATE INSERTED:  REMOVE:  [ ] YES [ ] NO  EXPLAIN:    REVIEW OF SYSTEMS: [x] Unable to obtain because: delirium     ICU Vital Signs Last 24 Hrs  T(C): 37.2 (07 Jun 2019 11:32), Max: 37.2 (07 Jun 2019 11:32)  T(F): 99 (07 Jun 2019 11:32), Max: 99 (07 Jun 2019 11:32)  HR: 99 (07 Jun 2019 10:00) (55 - 99)  BP: 94/60 (07 Jun 2019 10:00) (63/44 - 149/81)  BP(mean): 67 (07 Jun 2019 10:00) (41 - 111)  ABP: --  ABP(mean): --  RR: 15 (07 Jun 2019 10:00) (13 - 23)  SpO2: 100% (07 Jun 2019 09:00) (84% - 100%)      I&O's Detail    06 Jun 2019 07:01  -  07 Jun 2019 07:00  --------------------------------------------------------  IN:    dexmedetomidine Infusion: 76.3 mL    Oral Fluid: 600 mL    Sodium Chloride 0.9% IV Bolus: 1000 mL  Total IN: 1676.3 mL    OUT:    Incontinent per Condom Catheter: 500 mL  Total OUT: 500 mL    Total NET: 1176.3 mL      07 Jun 2019 07:01  -  07 Jun 2019 11:40  --------------------------------------------------------  IN:    Oral Fluid: 240 mL  Total IN: 240 mL    OUT:  Total OUT: 0 mL    Total NET: 240 mL      MEDICATIONS  NEURO  Meds: acetaminophen   Tablet .. 650 milliGRAM(s) Oral every 6 hours PRN Mild Pain (1 - 3)  dexmedetomidine Infusion 0.2 MICROgram(s)/kG/Hr (3.655 mL/Hr) IV Continuous <Continuous>  ibuprofen  Tablet. 400 milliGRAM(s) Oral every 6 hours PRN Moderate Pain (4 - 6)  PHENobarbital 129.6 milliGRAM(s) Oral every 4 hours  PHENobarbital   Oral   PHENobarbital Injectable 130 milliGRAM(s) IV Push every 15 minutes PRN severe agitation/CIWA 8-20  PHENobarbital Injectable 260 milliGRAM(s) IV Push every 15 minutes PRN ciwa 20 or greater    RESPIRATORY    Meds:   CARDIOVASCULAR  Meds:   GI/NUTRITION  Meds: pantoprazole    Tablet 40 milliGRAM(s) Oral before breakfast    GENITOURINARY  Meds: folic acid 1 milliGRAM(s) Oral daily  multivitamin 1 Tablet(s) Oral daily  thiamine 100 milliGRAM(s) Oral daily    HEMATOLOGIC  Meds: heparin  Injectable 5000 Unit(s) SubCutaneous every 12 hours    [x] VTE Prophylaxis  INFECTIOUS DISEASES  Meds:   ENDOCRINE  CAPILLARY BLOOD GLUCOSE    Meds:   OTHER MEDICATIONS:  :    PHYSICAL EXAM:    GENERAL: NAD, well-groomed, well-developed  HEAD:  Atraumatic, Normocephalic  EYES: EOMI, PERRLA, conjunctiva and sclera clear  ENMT: No tonsillar erythema, exudates, or enlargement; Moist mucous membranes, No lesions  NECK: Supple, No JVD, Normal thyroid  CHEST/LUNG: Clear to auscultation bilaterally; No rales, rhonchi, wheezing, or rubs  HEART: Regular rate and rhythm; No murmurs, rubs, or gallops  ABDOMEN: Soft, Nontender, Nondistended; Bowel sounds present  EXTREMITIES:  2+ Peripheral Pulses, No clubbing, cyanosis, or edema  LYMPH: No lymphadenopathy noted  SKIN: No rashes or lesions  NERVOUS SYSTEM:  Alert & Oriented X3, Good concentration; Motor Strength 5/5 B/L upper and lower extremities; DTRs 2+ intact and symmetric    LABS:                        9.8    3.26  )-----------( 204      ( 07 Jun 2019 04:35 )             32.6      06-07    139  |  105  |  12  ----------------------------<  72  4.0   |  23  |  0.58    Ca    9.5      07 Jun 2019 04:35	  Phos  4.1     06-07  Mg     1.8     06-07    TPro  7.3  /  Alb  2.9<L>  /  TBili  0.2  /  DBili  x   /  AST  28  /  ALT  34  /  AlkPhos  69  06-07    GLOBAL ISSUE/BEST PRACTICE:  Analgesia: NA  Sedation: Precedex/ Phenobarb  HOB elevation: yes  Stress ulcer prophylaxis: Protonix  VTE prophylaxis: HSQ  Oral Care: Chlorhexidine  Glycemic control: NA  Nutrition: Regular INTERVAL HPI/OVERNIGHT EVENTS:        47 yo female with h/o etoh abuse admitted for withdrawal seizures.  Course of hospitalization complicated by multiple RRTs/code gray for acute agitation +/- elevated CIWA scores.  Pt to be transferred to ICU for high dose phenobarb with precedex infusion. Patient noted to be delirious and agitated on the floor.  Given 1 time dose of phenobarb with minimal effect.  Will transfer to ICU for further management ETOH delirium    24 hour events: Required phenobarb 260IVP and 130IVP for agitation overnight.  Off precedex overnight.  Started on phenobarb taper starting at q4H dosing.      CENTRAL LINE: [ ] YES [x ] NO  LOCATION:   DATE INSERTED:  REMOVE: [ ] YES [ ] NO  EXPLAIN:    UREÑA: [ ] YES [ x] NO    DATE INSERTED:  REMOVE:  [ ] YES [ ] NO  EXPLAIN:    A-LINE:  [ ] YES [x ] NO  LOCATION:   DATE INSERTED:  REMOVE:  [ ] YES [ ] NO  EXPLAIN:    REVIEW OF SYSTEMS: [x] Unable to obtain because: delirium     ICU Vital Signs Last 24 Hrs  T(C): 37.2 (07 Jun 2019 11:32), Max: 37.2 (07 Jun 2019 11:32)  T(F): 99 (07 Jun 2019 11:32), Max: 99 (07 Jun 2019 11:32)  HR: 99 (07 Jun 2019 10:00) (55 - 99)  BP: 94/60 (07 Jun 2019 10:00) (63/44 - 149/81)  BP(mean): 67 (07 Jun 2019 10:00) (41 - 111)  ABP: --  ABP(mean): --  RR: 15 (07 Jun 2019 10:00) (13 - 23)  SpO2: 100% (07 Jun 2019 09:00) (84% - 100%)      I&O's Detail    06 Jun 2019 07:01  -  07 Jun 2019 07:00  --------------------------------------------------------  IN:    dexmedetomidine Infusion: 76.3 mL    Oral Fluid: 600 mL    Sodium Chloride 0.9% IV Bolus: 1000 mL  Total IN: 1676.3 mL    OUT:    Incontinent per Condom Catheter: 500 mL  Total OUT: 500 mL    Total NET: 1176.3 mL      07 Jun 2019 07:01  -  07 Jun 2019 11:40  --------------------------------------------------------  IN:    Oral Fluid: 240 mL  Total IN: 240 mL    OUT:  Total OUT: 0 mL    Total NET: 240 mL      MEDICATIONS  NEURO  Meds: acetaminophen   Tablet .. 650 milliGRAM(s) Oral every 6 hours PRN Mild Pain (1 - 3)  dexmedetomidine Infusion 0.2 MICROgram(s)/kG/Hr (3.655 mL/Hr) IV Continuous <Continuous>  ibuprofen  Tablet. 400 milliGRAM(s) Oral every 6 hours PRN Moderate Pain (4 - 6)  PHENobarbital 129.6 milliGRAM(s) Oral every 4 hours  PHENobarbital   Oral   PHENobarbital Injectable 130 milliGRAM(s) IV Push every 15 minutes PRN severe agitation/CIWA 8-20  PHENobarbital Injectable 260 milliGRAM(s) IV Push every 15 minutes PRN ciwa 20 or greater    RESPIRATORY    Meds:   CARDIOVASCULAR  Meds:   GI/NUTRITION  Meds: pantoprazole    Tablet 40 milliGRAM(s) Oral before breakfast    GENITOURINARY  Meds: folic acid 1 milliGRAM(s) Oral daily  multivitamin 1 Tablet(s) Oral daily  thiamine 100 milliGRAM(s) Oral daily    HEMATOLOGIC  Meds: heparin  Injectable 5000 Unit(s) SubCutaneous every 12 hours    [x] VTE Prophylaxis  INFECTIOUS DISEASES  Meds:   ENDOCRINE  CAPILLARY BLOOD GLUCOSE    Meds:   OTHER MEDICATIONS:  :    PHYSICAL EXAM:    GENERAL: agitated at times disorganized, with incomprehensible mumbling at times.   HEAD:  Atraumatic, Normocephalic  NECK: Supple, No JVD, Normal thyroid  CHEST/LUNG: Clear to auscultation bilaterally; No rales, rhonchi, wheezing, or rubs  HEART: Regular rate and rhythm; No murmurs, rubs, or gallops  ABDOMEN: Soft, Nontender, Nondistended; Bowel sounds present  EXTREMITIES:  2+ Peripheral Pulses, No clubbing, cyanosis, or edema  NERVOUS SYSTEM:  Moving all extremities, no focal deficits, reports she wants to leave at times, subsequently becoming agitated.      LABS:                        9.8    3.26  )-----------( 204      ( 07 Jun 2019 04:35 )             32.6      06-07    139  |  105  |  12  ----------------------------<  72  4.0   |  23  |  0.58    Ca    9.5      07 Jun 2019 04:35	  Phos  4.1     06-07  Mg     1.8     06-07    TPro  7.3  /  Alb  2.9<L>  /  TBili  0.2  /  DBili  x   /  AST  28  /  ALT  34  /  AlkPhos  69  06-07    GLOBAL ISSUE/BEST PRACTICE:  Analgesia: NA  Sedation: Precedex/ Phenobarb  HOB elevation: yes  Stress ulcer prophylaxis: Protonix  VTE prophylaxis: HSQ  Oral Care: Chlorhexidine  Glycemic control: NA  Nutrition: Regular

## 2019-06-07 NOTE — PROGRESS NOTE BEHAVIORAL HEALTH - SUMMARY
- start bridging non-alcohol withdrawal agitation management (ie. behavioral issues, demanding to leave etc) with haldol (goal: using benzos/phenobarb less as behavioral PRNs which can prolong hospitalization and prolong delirium/confusion)  - start haldol 3mg IVP standing q6hrs for 1 day, then q8hrs x 1 day, then BID x1 day ...  - if haldol 3mg IVP is not sufficient to control behaviors, increase to 5mg IVP with same frequency as above  - depending on need, haldol can be given q4 / q5 hrs as well   - as long as QTc remains below 500msec, can continue to use haldol IVPs  - will re-evaluate Patient once she is awake, alert and able to participate/tolerate a psychiatric assessment , until that time, she cannot leave [facility] given her lack of capacity thus far

## 2019-06-08 LAB
ACETONE SERPL-MCNC: ABNORMAL
ANION GAP SERPL CALC-SCNC: 9 MMOL/L — SIGNIFICANT CHANGE UP (ref 5–17)
BUN SERPL-MCNC: 13 MG/DL — SIGNIFICANT CHANGE UP (ref 7–23)
CALCIUM SERPL-MCNC: 9.8 MG/DL — SIGNIFICANT CHANGE UP (ref 8.5–10.1)
CHLORIDE SERPL-SCNC: 102 MMOL/L — SIGNIFICANT CHANGE UP (ref 96–108)
CO2 SERPL-SCNC: 24 MMOL/L — SIGNIFICANT CHANGE UP (ref 22–31)
CREAT SERPL-MCNC: 0.58 MG/DL — SIGNIFICANT CHANGE UP (ref 0.5–1.3)
GLUCOSE SERPL-MCNC: 90 MG/DL — SIGNIFICANT CHANGE UP (ref 70–99)
HCT VFR BLD CALC: 33.2 % — LOW (ref 34.5–45)
HGB BLD-MCNC: 10.2 G/DL — LOW (ref 11.5–15.5)
MAGNESIUM SERPL-MCNC: 2 MG/DL — SIGNIFICANT CHANGE UP (ref 1.6–2.6)
MCHC RBC-ENTMCNC: 23.4 PG — LOW (ref 27–34)
MCHC RBC-ENTMCNC: 30.7 GM/DL — LOW (ref 32–36)
MCV RBC AUTO: 76.3 FL — LOW (ref 80–100)
NRBC # BLD: 0 /100 WBCS — SIGNIFICANT CHANGE UP (ref 0–0)
PHOSPHATE SERPL-MCNC: 4.3 MG/DL — SIGNIFICANT CHANGE UP (ref 2.5–4.5)
PLATELET # BLD AUTO: 246 K/UL — SIGNIFICANT CHANGE UP (ref 150–400)
POTASSIUM SERPL-MCNC: 4 MMOL/L — SIGNIFICANT CHANGE UP (ref 3.5–5.3)
POTASSIUM SERPL-SCNC: 4 MMOL/L — SIGNIFICANT CHANGE UP (ref 3.5–5.3)
RBC # BLD: 4.35 M/UL — SIGNIFICANT CHANGE UP (ref 3.8–5.2)
RBC # FLD: 20.1 % — HIGH (ref 10.3–14.5)
SODIUM SERPL-SCNC: 135 MMOL/L — SIGNIFICANT CHANGE UP (ref 135–145)
WBC # BLD: 3.93 K/UL — SIGNIFICANT CHANGE UP (ref 3.8–10.5)
WBC # FLD AUTO: 3.93 K/UL — SIGNIFICANT CHANGE UP (ref 3.8–10.5)

## 2019-06-08 PROCEDURE — 99233 SBSQ HOSP IP/OBS HIGH 50: CPT

## 2019-06-08 PROCEDURE — 99231 SBSQ HOSP IP/OBS SF/LOW 25: CPT

## 2019-06-08 RX ORDER — PHENOBARBITAL 60 MG
64.8 TABLET ORAL EVERY 12 HOURS
Refills: 0 | Status: DISCONTINUED | OUTPATIENT
Start: 2019-06-10 | End: 2019-06-11

## 2019-06-08 RX ORDER — PHENOBARBITAL 60 MG
64.8 TABLET ORAL EVERY 8 HOURS
Refills: 0 | Status: DISCONTINUED | OUTPATIENT
Start: 2019-06-09 | End: 2019-06-10

## 2019-06-08 RX ORDER — PHENOBARBITAL 60 MG
TABLET ORAL
Refills: 0 | Status: COMPLETED | OUTPATIENT
Start: 2019-06-08 | End: 2019-06-11

## 2019-06-08 RX ORDER — PHENOBARBITAL 60 MG
64.8 TABLET ORAL EVERY 6 HOURS
Refills: 0 | Status: DISCONTINUED | OUTPATIENT
Start: 2019-06-08 | End: 2019-06-09

## 2019-06-08 RX ADMIN — HALOPERIDOL DECANOATE 3 MILLIGRAM(S): 100 INJECTION INTRAMUSCULAR at 20:45

## 2019-06-08 RX ADMIN — HALOPERIDOL DECANOATE 3 MILLIGRAM(S): 100 INJECTION INTRAMUSCULAR at 12:41

## 2019-06-08 RX ADMIN — Medication 129.6 MILLIGRAM(S): at 06:20

## 2019-06-08 RX ADMIN — Medication 130 MILLIGRAM(S): at 16:40

## 2019-06-08 RX ADMIN — Medication 1 TABLET(S): at 12:44

## 2019-06-08 RX ADMIN — HEPARIN SODIUM 5000 UNIT(S): 5000 INJECTION INTRAVENOUS; SUBCUTANEOUS at 17:49

## 2019-06-08 RX ADMIN — HEPARIN SODIUM 5000 UNIT(S): 5000 INJECTION INTRAVENOUS; SUBCUTANEOUS at 06:19

## 2019-06-08 RX ADMIN — Medication 1 MILLIGRAM(S): at 12:41

## 2019-06-08 RX ADMIN — Medication 130 MILLIGRAM(S): at 21:47

## 2019-06-08 RX ADMIN — Medication 130 MILLIGRAM(S): at 16:00

## 2019-06-08 RX ADMIN — Medication 129.6 MILLIGRAM(S): at 02:31

## 2019-06-08 RX ADMIN — PANTOPRAZOLE SODIUM 40 MILLIGRAM(S): 20 TABLET, DELAYED RELEASE ORAL at 06:20

## 2019-06-08 RX ADMIN — Medication 100 MILLIGRAM(S): at 12:41

## 2019-06-08 NOTE — PROGRESS NOTE BEHAVIORAL HEALTH - NSBHFUPINTERVALHXFT_PSY_A_CORE
Patient seen and evaluated, chart reviewed. Pt received only x1 dose of standing Haldol ordered yesterday at 6:30 pm, 2 doses withheld overnight as pt was very sedated. She received stat dose of Haldol 5mgs IV at 3 PM and standing Q4h Phenobarb instead of prn Haldol when pt was agitated. Nursing staff aware to use more of the Haldol taper than Phenobarb or Benzos so patient can be more alert and participate safely in her care.  Pt was very sedated at time of assessment, responsive to firm tactile stimuli but could not participate in assessment. QTc remains under 500 - 440 today. CIWA - 7.

## 2019-06-08 NOTE — PROGRESS NOTE BEHAVIORAL HEALTH - OTHER
pt sedated no overt tremors noted deferred at this time soft, long drawn out and slurring unable to articulate / describe looks sedated unable to assess at this time with high clinical certainty unable to ascertain at this time unable assess/complete at this time unable to assess/complete at this time unable to tolerate an MMSE at this time

## 2019-06-08 NOTE — PROGRESS NOTE ADULT - SUBJECTIVE AND OBJECTIVE BOX
INTERVAL HPI/OVERNIGHT EVENTS:      47 yo female with h/o etoh abuse admitted for withdrawal seizures.  Course of hospitalization complicated by multiple RRTs/code gray for acute agitation +/- elevated CIWA scores.  Pt to be transferred to ICU for high dose phenobarb with precedex infusion. Patient noted to be delirious and agitated on the floor.  Given 1 time dose of phenobarb with minimal effect.  Will transfer to ICU for further management ETOH delirium    24 hour events: Changed to PO phenobarb last night, reported to be calm and sleeping overnight.  missed one dose of phenobarb 2/2 sedation/sleeping.  Haldol added as per pysch recommendations, QT <500 this AM.  Decreased phenobarb dose today to 65 Q6H given missed doses.  Will adjust accordingly.    CENTRAL LINE: [ ] YES [ x] NO  LOCATION:   DATE INSERTED:  REMOVE: [ ] YES [ ] NO  EXPLAIN:    UREÑA: [ ] YES [x ] NO    DATE INSERTED:  REMOVE:  [ ] YES [ ] NO  EXPLAIN:    A-LINE:  [ ] YES [x ] NO  LOCATION:   DATE INSERTED:  REMOVE:  [ ] YES [ ] NO  EXPLAIN:    REVIEW OF SYSTEMS: [x] Unable to obtain because: delirium and agitation; sleeping this AM and calm.      ICU Vital Signs Last 24 Hrs  T(C): 36.3 (08 Jun 2019 07:14), Max: 37.3 (07 Jun 2019 15:11)  T(F): 97.4 (08 Jun 2019 07:14), Max: 99.1 (07 Jun 2019 15:11)  HR: 73 (08 Jun 2019 10:00) (67 - 106)  BP: 102/67 (08 Jun 2019 10:00) (92/53 - 140/116)  BP(mean): 75 (08 Jun 2019 10:00) (61 - 121)  ABP: --  ABP(mean): --  RR: 19 (08 Jun 2019 10:00) (14 - 29)  SpO2: 100% (08 Jun 2019 10:00) (88% - 100%)      I&O's Detail    07 Jun 2019 07:01  -  08 Jun 2019 07:00  --------------------------------------------------------  IN:    Oral Fluid: 840 mL  Total IN: 840 mL    OUT:    Incontinent per Condom Catheter: 900 mL    Voided: 600 mL  Total OUT: 1500 mL    Total NET: -660 mL      08 Jun 2019 07:01  -  08 Jun 2019 11:07  --------------------------------------------------------  IN:  Total IN: 0 mL    OUT:  Total OUT: 0 mL    Total NET: 0 mL              MEDICATIONS  NEURO  Meds: acetaminophen   Tablet .. 650 milliGRAM(s) Oral every 6 hours PRN Mild Pain (1 - 3)  dexmedetomidine Infusion 0.2 MICROgram(s)/kG/Hr (3.655 mL/Hr) IV Continuous <Continuous>  haloperidol    Injectable 3 milliGRAM(s) IV Push every 6 hours  haloperidol    Injectable 3 milliGRAM(s) IV Push every 8 hours  ibuprofen  Tablet. 400 milliGRAM(s) Oral every 6 hours PRN Moderate Pain (4 - 6)  PHENobarbital 64.8 milliGRAM(s) Oral every 6 hours  PHENobarbital   Oral   PHENobarbital Injectable 130 milliGRAM(s) IV Push every 15 minutes PRN severe agitation/CIWA 8-20  PHENobarbital Injectable 260 milliGRAM(s) IV Push every 15 minutes PRN ciwa 20 or greater    RESPIRATORY    Meds:   CARDIOVASCULAR  Meds:   GI/NUTRITION  Meds: pantoprazole    Tablet 40 milliGRAM(s) Oral before breakfast    GENITOURINARY  Meds: folic acid 1 milliGRAM(s) Oral daily  multivitamin 1 Tablet(s) Oral daily  thiamine 100 milliGRAM(s) Oral daily    HEMATOLOGIC  Meds: heparin  Injectable 5000 Unit(s) SubCutaneous every 12 hours    [x] VTE Prophylaxis  INFECTIOUS DISEASES  Meds:   ENDOCRINE  CAPILLARY BLOOD GLUCOSE        Meds:   OTHER MEDICATIONS:  :    PHYSICAL EXAM:    GEN - disoriented at times, sleeping on rounds  CARD -s1s2, RRR, no M,G,R;   PULM - CTA b/l, symmetric breath sounds;   ABD:  +BS, ND, NT, soft, no guarding, no rebound, no masses;   BACK: no CVA tenderness, Normal  spine;   EXT: symmetric pulses, 2+ dp, capillary refill < 2 seconds, no clubbing, no cyanosis, no edema  Neuro: moving all extremities, no focal deficits    LABS:                        10.2   3.93  )-----------( 246      ( 08 Jun 2019 07:16 )             33.2      06-08    135  |  102  |  13  ----------------------------<  90  4.0   |  24  |  0.58    Ca    9.8      08 Jun 2019 04:41  Phos  4.3     06-08  Mg     2.0     06-08    TPro  7.3  /  Alb  2.9<L>  /  TBili  0.2  /  DBili  x   /  AST  28  /  ALT  34  /  AlkPhos  69  06-07        GLOBAL ISSUE/BEST PRACTICE:  Analgesia: NA  Sedation: Precedex/ Phenobarb  HOB elevation: yes  Stress ulcer prophylaxis: Protonix	  VTE prophylaxis: HSQ  Oral Care: Chlorhexidine  Glycemic control: NA  Nutrition: Regular

## 2019-06-08 NOTE — PROGRESS NOTE BEHAVIORAL HEALTH - SUMMARY
As per Dr. Pan 9/7/19 - start bridging non-alcohol withdrawal agitation management (ie. behavioral issues, demanding to leave etc) with haldol (goal: using benzos/phenobarb less as behavioral PRNs which can prolong hospitalization and prolong delirium/confusion)  - start haldol 3mg IVP standing q6hrs for 1 day, then q8hrs x 1 day, then BID x1 day ...  - if haldol 3mg IVP is not sufficient to control behaviors, increase to 5mg IVP with same frequency as above  - depending on need, haldol can be given q4 / q5 hrs as well   - as long as QTc remains below 500msec, can continue to use haldol IVPs  - will re-evaluate Patient once she is awake, alert and able to participate/tolerate a psychiatric assessment , until that time, she cannot leave [facility] given her lack of capacity thus far.  9/8/18 - Patient seen and evaluated, chart reviewed. Pt received only x1 dose of standing Haldol ordered yesterday at 6:30 pm, 2 doses withheld overnight as pt was very sedated. She received stat dose of Haldol 5mgs IV at 3 PM and standing Q4h Phenobarb instead of prn Haldol when pt was agitated. Nursing staff aware to use more of the Haldol taper than Phenobarb or Benzos so patient can be more alert and participate safely in her care.  Pt was very sedated at time of assessment, responsive to firm tactile stimuli but could not participate in assessment. QTc remains under 500 - 440 today. CIWA - 7.  - Continue with medication taper as above.  - C/L to follow up on Monday to reassess.

## 2019-06-08 NOTE — PROGRESS NOTE BEHAVIORAL HEALTH - NSBHFUPINTERVALCCFT_PSY_A_CORE
Pt received only x1 dose of standing Haldol ordered yesterday at 6:30 pm, 2 doses withheld overnight as pt was very sedated. She received stat dose of Haldol 5mgs IV at 3 PM and standing Q4h Phenobarb instead of prn Haldol when pt was agitated.

## 2019-06-08 NOTE — PROGRESS NOTE BEHAVIORAL HEALTH - NSBHCONSULTSUBSTANCEALCOHOL_PSY_A_CORE FT
Start bridging non-alcohol withdrawal agitation management - Using less Benzos/Phenobarb, continue standing Haldol taper and prns

## 2019-06-08 NOTE — PROGRESS NOTE BEHAVIORAL HEALTH - NSBHFUPVIOLFT_PSY_A_CORE
see above
Pt has been agitated and aggressive with staff and equipment when awake since admission. Pulled out needed tubing.

## 2019-06-09 LAB
ANION GAP SERPL CALC-SCNC: 14 MMOL/L — SIGNIFICANT CHANGE UP (ref 5–17)
BUN SERPL-MCNC: 9 MG/DL — SIGNIFICANT CHANGE UP (ref 7–23)
CALCIUM SERPL-MCNC: 9.7 MG/DL — SIGNIFICANT CHANGE UP (ref 8.5–10.1)
CHLORIDE SERPL-SCNC: 105 MMOL/L — SIGNIFICANT CHANGE UP (ref 96–108)
CO2 SERPL-SCNC: 19 MMOL/L — LOW (ref 22–31)
CREAT SERPL-MCNC: 0.66 MG/DL — SIGNIFICANT CHANGE UP (ref 0.5–1.3)
GLUCOSE SERPL-MCNC: 88 MG/DL — SIGNIFICANT CHANGE UP (ref 70–99)
HCT VFR BLD CALC: 35.5 % — SIGNIFICANT CHANGE UP (ref 34.5–45)
HGB BLD-MCNC: 10.5 G/DL — LOW (ref 11.5–15.5)
MAGNESIUM SERPL-MCNC: 1.4 MG/DL — LOW (ref 1.6–2.6)
MCHC RBC-ENTMCNC: 22.7 PG — LOW (ref 27–34)
MCHC RBC-ENTMCNC: 29.6 GM/DL — LOW (ref 32–36)
MCV RBC AUTO: 76.7 FL — LOW (ref 80–100)
NRBC # BLD: 0 /100 WBCS — SIGNIFICANT CHANGE UP (ref 0–0)
PHOSPHATE SERPL-MCNC: 4.7 MG/DL — HIGH (ref 2.5–4.5)
PLATELET # BLD AUTO: 241 K/UL — SIGNIFICANT CHANGE UP (ref 150–400)
POTASSIUM SERPL-MCNC: 4.6 MMOL/L — SIGNIFICANT CHANGE UP (ref 3.5–5.3)
POTASSIUM SERPL-SCNC: 4.6 MMOL/L — SIGNIFICANT CHANGE UP (ref 3.5–5.3)
RBC # BLD: 4.63 M/UL — SIGNIFICANT CHANGE UP (ref 3.8–5.2)
RBC # FLD: 20.5 % — HIGH (ref 10.3–14.5)
SODIUM SERPL-SCNC: 138 MMOL/L — SIGNIFICANT CHANGE UP (ref 135–145)
WBC # BLD: 3.02 K/UL — LOW (ref 3.8–10.5)
WBC # FLD AUTO: 3.02 K/UL — LOW (ref 3.8–10.5)

## 2019-06-09 PROCEDURE — 99233 SBSQ HOSP IP/OBS HIGH 50: CPT

## 2019-06-09 RX ORDER — HALOPERIDOL DECANOATE 100 MG/ML
5 INJECTION INTRAMUSCULAR EVERY 6 HOURS
Refills: 0 | Status: DISCONTINUED | OUTPATIENT
Start: 2019-06-09 | End: 2019-06-14

## 2019-06-09 RX ORDER — MAGNESIUM SULFATE 500 MG/ML
2 VIAL (ML) INJECTION ONCE
Refills: 0 | Status: COMPLETED | OUTPATIENT
Start: 2019-06-09 | End: 2019-06-09

## 2019-06-09 RX ORDER — HALOPERIDOL DECANOATE 100 MG/ML
5 INJECTION INTRAMUSCULAR ONCE
Refills: 0 | Status: COMPLETED | OUTPATIENT
Start: 2019-06-09 | End: 2019-06-09

## 2019-06-09 RX ADMIN — HEPARIN SODIUM 5000 UNIT(S): 5000 INJECTION INTRAVENOUS; SUBCUTANEOUS at 06:39

## 2019-06-09 RX ADMIN — Medication 64.8 MILLIGRAM(S): at 14:06

## 2019-06-09 RX ADMIN — HALOPERIDOL DECANOATE 3 MILLIGRAM(S): 100 INJECTION INTRAMUSCULAR at 05:08

## 2019-06-09 RX ADMIN — Medication 1 MILLIGRAM(S): at 14:00

## 2019-06-09 RX ADMIN — PANTOPRAZOLE SODIUM 40 MILLIGRAM(S): 20 TABLET, DELAYED RELEASE ORAL at 13:45

## 2019-06-09 RX ADMIN — Medication 50 GRAM(S): at 06:39

## 2019-06-09 RX ADMIN — Medication 130 MILLIGRAM(S): at 10:30

## 2019-06-09 RX ADMIN — Medication 64.8 MILLIGRAM(S): at 04:59

## 2019-06-09 RX ADMIN — HALOPERIDOL DECANOATE 5 MILLIGRAM(S): 100 INJECTION INTRAMUSCULAR at 21:24

## 2019-06-09 RX ADMIN — HEPARIN SODIUM 5000 UNIT(S): 5000 INJECTION INTRAVENOUS; SUBCUTANEOUS at 17:10

## 2019-06-09 RX ADMIN — HALOPERIDOL DECANOATE 5 MILLIGRAM(S): 100 INJECTION INTRAMUSCULAR at 10:49

## 2019-06-09 RX ADMIN — Medication 64.8 MILLIGRAM(S): at 21:00

## 2019-06-09 RX ADMIN — HALOPERIDOL DECANOATE 3 MILLIGRAM(S): 100 INJECTION INTRAMUSCULAR at 14:03

## 2019-06-09 RX ADMIN — Medication 1 TABLET(S): at 14:01

## 2019-06-09 NOTE — PROGRESS NOTE ADULT - SUBJECTIVE AND OBJECTIVE BOX
INTERVAL HPI/OVERNIGHT EVENTS:      47 yo female with h/o etoh abuse admitted for withdrawal seizures.  Course of hospitalization complicated by multiple RRTs/code gray for acute agitation +/- elevated CIWA scores.  Pt to be transferred to ICU for high dose phenobarb with precedex infusion. Patient noted to be delirious and agitated on the floor.  Given 1 time dose of phenobarb with minimal effect.  Will transfer to ICU for further management ETOH delirium    24 hour events: Patient noted to be agitated x 2 overnight requiring additional phenobarb.  Patient is awake this AM speaking to her boyfriend who is in [place].  QTc is 460 today.      CENTRAL LINE: [ ] YES [ x] NO  LOCATION:   DATE INSERTED:  REMOVE: [ ] YES [ ] NO  EXPLAIN:    UREÑA: [ ] YES [x ] NO    DATE INSERTED:  REMOVE:  [ ] YES [ ] NO  EXPLAIN:    A-LINE:  [ ] YES [x ] NO  LOCATION:   DATE INSERTED:  REMOVE:  [ ] YES [ ] NO  EXPLAIN:    REVIEW OF SYSTEMS: [x] Unable to obtain because: delirium awake this AM asking to go home.    ICU Vital Signs Last 24 Hrs  T(C): 36.2 (09 Jun 2019 08:31), Max: 36.9 (08 Jun 2019 11:50)  T(F): 97.2 (09 Jun 2019 08:31), Max: 98.5 (08 Jun 2019 11:50)  HR: 74 (09 Jun 2019 09:00) (67 - 93)  BP: 116/75 (09 Jun 2019 09:00) (93/54 - 129/81)  BP(mean): 85 (09 Jun 2019 09:00) (63 - 96)  ABP: --  ABP(mean): --  RR: 10 (09 Jun 2019 09:00) (9 - 24)  SpO2: 100% (09 Jun 2019 09:00) (82% - 100%)      I&O's Detail    08 Jun 2019 07:01  -  09 Jun 2019 07:00  --------------------------------------------------------  IN:    Solution: 50 mL  Total IN: 50 mL    OUT:    Incontinent per Condom Catheter: 450 mL  Total OUT: 450 mL    Total NET: -400 mL      09 Jun 2019 07:01  -  09 Jun 2019 10:39  --------------------------------------------------------  IN:    Oral Fluid: 240 mL  Total IN: 240 mL    OUT:  Total OUT: 0 mL    Total NET: 240 mL              MEDICATIONS  NEURO  Meds: acetaminophen   Tablet .. 650 milliGRAM(s) Oral every 6 hours PRN Mild Pain (1 - 3)  dexmedetomidine Infusion 0.2 MICROgram(s)/kG/Hr (3.655 mL/Hr) IV Continuous <Continuous>  haloperidol    Injectable 3 milliGRAM(s) IV Push every 8 hours  haloperidol    Injectable 5 milliGRAM(s) IV Push every 6 hours PRN agitation  ibuprofen  Tablet. 400 milliGRAM(s) Oral every 6 hours PRN Moderate Pain (4 - 6)  PHENobarbital   Oral   PHENobarbital 64.8 milliGRAM(s) Oral every 8 hours  PHENobarbital Injectable 130 milliGRAM(s) IV Push every 15 minutes PRN severe agitation/CIWA 8-20    RESPIRATORY    Meds:   CARDIOVASCULAR  Meds:   GI/NUTRITION  Meds: pantoprazole    Tablet 40 milliGRAM(s) Oral before breakfast    GENITOURINARY  Meds: folic acid 1 milliGRAM(s) Oral daily  multivitamin 1 Tablet(s) Oral daily    HEMATOLOGIC  Meds: heparin  Injectable 5000 Unit(s) SubCutaneous every 12 hours    [x] VTE Prophylaxis  INFECTIOUS DISEASES  Meds:   ENDOCRINE  CAPILLARY BLOOD GLUCOSE        Meds:   OTHER MEDICATIONS:  :    PHYSICAL EXAM:    GEN - disoriented at times, speaking on phone to boyfriend  CARD -s1s2, RRR, no M,G,R;   PULM - CTA b/l, symmetric breath sounds;   ABD:  +BS, ND, NT, soft, no guarding, no rebound, no masses;   BACK: no CVA tenderness, Normal  spine;   EXT: symmetric pulses, 2+ dp, capillary refill < 2 seconds, no clubbing, no cyanosis, no edema  Neuro: moving all extremities, no focal deficits    LABS:                        10.5   3.02  )-----------( 241      ( 09 Jun 2019 06:54 )             35.5      06-09    138  |  105  |  9   ----------------------------<  88  4.6   |  19<L>  |  0.66    Ca    9.7      09 Jun 2019 04:19  Phos  4.7     06-09  Mg     1.4     06-09    GLOBAL ISSUE/BEST PRACTICE:  Analgesia: NA  Sedation: Precedex/ Phenobarb  HOB elevation: yes  Stress ulcer prophylaxis: Protonix	  VTE prophylaxis: HSQ  Oral Care: Chlorhexidine  Glycemic control: NA  Nutrition: Regular

## 2019-06-10 LAB
ALBUMIN SERPL ELPH-MCNC: 3.3 G/DL — SIGNIFICANT CHANGE UP (ref 3.3–5)
ALP SERPL-CCNC: 96 U/L — SIGNIFICANT CHANGE UP (ref 40–120)
ALT FLD-CCNC: 28 U/L — SIGNIFICANT CHANGE UP (ref 12–78)
ANION GAP SERPL CALC-SCNC: 9 MMOL/L — SIGNIFICANT CHANGE UP (ref 5–17)
AST SERPL-CCNC: 23 U/L — SIGNIFICANT CHANGE UP (ref 15–37)
BASOPHILS # BLD AUTO: 0 K/UL — SIGNIFICANT CHANGE UP (ref 0–0.2)
BASOPHILS NFR BLD AUTO: 0 % — SIGNIFICANT CHANGE UP (ref 0–2)
BILIRUB SERPL-MCNC: 0.4 MG/DL — SIGNIFICANT CHANGE UP (ref 0.2–1.2)
BUN SERPL-MCNC: 12 MG/DL — SIGNIFICANT CHANGE UP (ref 7–23)
CALCIUM SERPL-MCNC: 10 MG/DL — SIGNIFICANT CHANGE UP (ref 8.5–10.1)
CHLORIDE SERPL-SCNC: 101 MMOL/L — SIGNIFICANT CHANGE UP (ref 96–108)
CO2 SERPL-SCNC: 27 MMOL/L — SIGNIFICANT CHANGE UP (ref 22–31)
CREAT SERPL-MCNC: 0.76 MG/DL — SIGNIFICANT CHANGE UP (ref 0.5–1.3)
EOSINOPHIL # BLD AUTO: 0.05 K/UL — SIGNIFICANT CHANGE UP (ref 0–0.5)
EOSINOPHIL NFR BLD AUTO: 1 % — SIGNIFICANT CHANGE UP (ref 0–6)
GIANT PLATELETS BLD QL SMEAR: PRESENT — SIGNIFICANT CHANGE UP
GLUCOSE SERPL-MCNC: 84 MG/DL — SIGNIFICANT CHANGE UP (ref 70–99)
HCT VFR BLD CALC: 34.4 % — LOW (ref 34.5–45)
HGB BLD-MCNC: 10.4 G/DL — LOW (ref 11.5–15.5)
HYPOCHROMIA BLD QL: SLIGHT — SIGNIFICANT CHANGE UP
LYMPHOCYTES # BLD AUTO: 1.86 K/UL — SIGNIFICANT CHANGE UP (ref 1–3.3)
LYMPHOCYTES # BLD AUTO: 34 % — SIGNIFICANT CHANGE UP (ref 13–44)
MAGNESIUM SERPL-MCNC: 1.9 MG/DL — SIGNIFICANT CHANGE UP (ref 1.6–2.6)
MANUAL DIF COMMENT BLD-IMP: SIGNIFICANT CHANGE UP
MCHC RBC-ENTMCNC: 23.3 PG — LOW (ref 27–34)
MCHC RBC-ENTMCNC: 30.2 GM/DL — LOW (ref 32–36)
MCV RBC AUTO: 77 FL — LOW (ref 80–100)
METAMYELOCYTES # FLD: 5 % — HIGH (ref 0–0)
MICROCYTES BLD QL: SIGNIFICANT CHANGE UP
MONOCYTES # BLD AUTO: 0.49 K/UL — SIGNIFICANT CHANGE UP (ref 0–0.9)
MONOCYTES NFR BLD AUTO: 9 % — SIGNIFICANT CHANGE UP (ref 2–14)
MYELOCYTES NFR BLD: 3 % — HIGH (ref 0–0)
NEUTROPHILS # BLD AUTO: 2.57 K/UL — SIGNIFICANT CHANGE UP (ref 1.8–7.4)
NEUTROPHILS NFR BLD AUTO: 46 % — SIGNIFICANT CHANGE UP (ref 43–77)
NEUTS BAND # BLD: 1 % — SIGNIFICANT CHANGE UP (ref 0–8)
NRBC # BLD: 0 /100 — SIGNIFICANT CHANGE UP (ref 0–0)
NRBC # BLD: SIGNIFICANT CHANGE UP /100 WBCS (ref 0–0)
PHOSPHATE SERPL-MCNC: 4.3 MG/DL — SIGNIFICANT CHANGE UP (ref 2.5–4.5)
PLAT MORPH BLD: NORMAL — SIGNIFICANT CHANGE UP
PLATELET # BLD AUTO: 264 K/UL — SIGNIFICANT CHANGE UP (ref 150–400)
POTASSIUM SERPL-MCNC: 4.1 MMOL/L — SIGNIFICANT CHANGE UP (ref 3.5–5.3)
POTASSIUM SERPL-SCNC: 4.1 MMOL/L — SIGNIFICANT CHANGE UP (ref 3.5–5.3)
PROT SERPL-MCNC: 8.2 GM/DL — SIGNIFICANT CHANGE UP (ref 6–8.3)
RBC # BLD: 4.47 M/UL — SIGNIFICANT CHANGE UP (ref 3.8–5.2)
RBC # FLD: 20.1 % — HIGH (ref 10.3–14.5)
RBC BLD AUTO: ABNORMAL
SODIUM SERPL-SCNC: 137 MMOL/L — SIGNIFICANT CHANGE UP (ref 135–145)
VARIANT LYMPHS # BLD: 1 % — SIGNIFICANT CHANGE UP (ref 0–6)
WBC # BLD: 5.46 K/UL — SIGNIFICANT CHANGE UP (ref 3.8–10.5)
WBC # FLD AUTO: 5.46 K/UL — SIGNIFICANT CHANGE UP (ref 3.8–10.5)

## 2019-06-10 PROCEDURE — 99233 SBSQ HOSP IP/OBS HIGH 50: CPT

## 2019-06-10 PROCEDURE — 93010 ELECTROCARDIOGRAM REPORT: CPT

## 2019-06-10 RX ORDER — POLYETHYLENE GLYCOL 3350 17 G/17G
17 POWDER, FOR SOLUTION ORAL DAILY
Refills: 0 | Status: DISCONTINUED | OUTPATIENT
Start: 2019-06-10 | End: 2019-06-14

## 2019-06-10 RX ADMIN — HEPARIN SODIUM 5000 UNIT(S): 5000 INJECTION INTRAVENOUS; SUBCUTANEOUS at 06:52

## 2019-06-10 RX ADMIN — HALOPERIDOL DECANOATE 3 MILLIGRAM(S): 100 INJECTION INTRAMUSCULAR at 00:14

## 2019-06-10 RX ADMIN — Medication 1 TABLET(S): at 12:13

## 2019-06-10 RX ADMIN — HEPARIN SODIUM 5000 UNIT(S): 5000 INJECTION INTRAVENOUS; SUBCUTANEOUS at 17:29

## 2019-06-10 RX ADMIN — Medication 1 MILLIGRAM(S): at 12:13

## 2019-06-10 RX ADMIN — PANTOPRAZOLE SODIUM 40 MILLIGRAM(S): 20 TABLET, DELAYED RELEASE ORAL at 07:08

## 2019-06-10 RX ADMIN — Medication 64.8 MILLIGRAM(S): at 07:08

## 2019-06-10 RX ADMIN — HALOPERIDOL DECANOATE 3 MILLIGRAM(S): 100 INJECTION INTRAMUSCULAR at 12:13

## 2019-06-10 RX ADMIN — Medication 64.8 MILLIGRAM(S): at 17:30

## 2019-06-10 NOTE — PROGRESS NOTE BEHAVIORAL HEALTH - OTHER
unable to articulate / describe looks sedated unable to assess at this time with high clinical certainty unable to ascertain at this time unable assess/complete at this time unable to assess/complete at this time unable to tolerate an MMSE at this time pt sedated no overt tremors noted deferred at this time soft, long drawn out and slurring

## 2019-06-10 NOTE — PROGRESS NOTE BEHAVIORAL HEALTH - SUMMARY
- Ativan PRN is not indicated for a CIWA score of 7 or less and should be discontinued  - Patient is finishing her phenobarb taper and should not be having any alcohol withdrawal symptoms as it has been effectively managed  - continue haldol taper   - Patient needs to be awake and alert in order for discharge planning to commence

## 2019-06-10 NOTE — PROGRESS NOTE ADULT - SUBJECTIVE AND OBJECTIVE BOX
Patient is a 48y old  Female who presents with a chief complaint of Seizure (09 Jun 2019 10:39)      SUBJECTIVE/OBJECTIVE:    Out of ICU   Patient resting comfortably.     MEDICATIONS  (STANDING):  folic acid 1 milliGRAM(s) Oral daily  heparin  Injectable 5000 Unit(s) SubCutaneous every 12 hours  multivitamin 1 Tablet(s) Oral daily  pantoprazole    Tablet 40 milliGRAM(s) Oral before breakfast  PHENobarbital   Oral   PHENobarbital 64.8 milliGRAM(s) Oral every 12 hours    MEDICATIONS  (PRN):  acetaminophen   Tablet .. 650 milliGRAM(s) Oral every 6 hours PRN Mild Pain (1 - 3)  haloperidol    Injectable 5 milliGRAM(s) IV Push every 6 hours PRN agitation  ibuprofen  Tablet. 400 milliGRAM(s) Oral every 6 hours PRN Moderate Pain (4 - 6)  LORazepam   Injectable 2 milliGRAM(s) IV Push every 2 hours PRN CIWA-Ar score increase by 2 points and a total score of 7 or less  LORazepam   Injectable 2 milliGRAM(s) IV Push every 1 hour PRN CIWA-Ar score 8 or greater  PHENobarbital Injectable 130 milliGRAM(s) IV Push every 15 minutes PRN severe agitation/CIWA 8-20  polyethylene glycol 3350 17 Gram(s) Oral daily PRN Constipation      Allergies    No Known Allergies    Intolerances          Vital Signs Last 24 Hrs  T(C): 37 (10 Gaudencio 2019 11:05), Max: 37 (10 Gaudencio 2019 11:05)  T(F): 98.6 (10 Gaudencio 2019 11:05), Max: 98.6 (10 Gaudencio 2019 11:05)  HR: 89 (10 Gaudencio 2019 11:05) (80 - 99)  BP: 106/69 (10 Gaudencio 2019 11:05) (104/62 - 133/77)  BP(mean): 85 (09 Jun 2019 19:00) (80 - 93)  RR: 17 (10 Gaudencio 2019 11:05) (14 - 18)  SpO2: 100% (10 Gaudencio 2019 11:05) (84% - 100%)    PHYSICAL EXAM:  GENERAL: NAD, well-groomed, well-developed  HEAD:  Atraumatic, Normocephalic  EYES: EOMI, PERRLA, conjunctiva and sclera clear  ENMT: No tonsillar erythema, exudates, or enlargement; Moist mucous membranes, No lesions  NECK: Supple, No JVD, Normal thyroid  NERVOUS SYSTEM:  Sleeping; Motor Strength 5/5   CHEST/LUNG: Clear bilaterally; No rales, rhonchi, wheezing, or rubs  HEART: Regular rate and rhythm; No murmurs, rubs, or gallops  ABDOMEN: Soft, Nontender, Nondistended; Bowel sounds present  EXTREMITIES:  2+ Peripheral Pulses, No clubbing, cyanosis, or edema  LYMPH: No lymphadenopathy noted  SKIN: No rashes or lesions    LABS:                        10.4   5.46  )-----------( 264      ( 10 Gaudencio 2019 07:36 )             34.4     06-10    137  |  101  |  12  ----------------------------<  84  4.1   |  27  |  0.76    Ca    10.0      10 Gaudencio 2019 07:36  Phos  4.3     06-10  Mg     1.9     06-10    TPro  8.2  /  Alb  3.3  /  TBili  0.4  /  DBili  x   /  AST  23  /  ALT  28  /  AlkPhos  96  06-10        CAPILLARY BLOOD GLUCOSE              RADIOLOGY & ADDITIONAL TESTS:        Consultant(s) Notes Reviewed:  [ ] YES  [ ] NO

## 2019-06-10 NOTE — PROGRESS NOTE BEHAVIORAL HEALTH - NSBHFUPINTERVALHXFT_PSY_A_CORE
Interval events over weekend: patient out of ICU and on Unit 2E as of 6/9/19. Interval events over weekend: Patient out of ICU and on Unit 2E as of 6/9/19. Patient snoring in bed asleep and does not awaken to name being called or shaking / touching. As per CO 1:1 staff, Patient ate some lunch and went back asleep. No behavioral issues as of now today.

## 2019-06-11 LAB
ANION GAP SERPL CALC-SCNC: 10 MMOL/L — SIGNIFICANT CHANGE UP (ref 5–17)
BUN SERPL-MCNC: 18 MG/DL — SIGNIFICANT CHANGE UP (ref 7–23)
CALCIUM SERPL-MCNC: 10.4 MG/DL — HIGH (ref 8.5–10.1)
CHLORIDE SERPL-SCNC: 98 MMOL/L — SIGNIFICANT CHANGE UP (ref 96–108)
CO2 SERPL-SCNC: 26 MMOL/L — SIGNIFICANT CHANGE UP (ref 22–31)
CREAT SERPL-MCNC: 0.83 MG/DL — SIGNIFICANT CHANGE UP (ref 0.5–1.3)
GLUCOSE SERPL-MCNC: 109 MG/DL — HIGH (ref 70–99)
HCT VFR BLD CALC: 35.3 % — SIGNIFICANT CHANGE UP (ref 34.5–45)
HGB BLD-MCNC: 10.9 G/DL — LOW (ref 11.5–15.5)
MAGNESIUM SERPL-MCNC: 2.1 MG/DL — SIGNIFICANT CHANGE UP (ref 1.6–2.6)
MCHC RBC-ENTMCNC: 23.3 PG — LOW (ref 27–34)
MCHC RBC-ENTMCNC: 30.9 GM/DL — LOW (ref 32–36)
MCV RBC AUTO: 75.4 FL — LOW (ref 80–100)
NRBC # BLD: 0 /100 WBCS — SIGNIFICANT CHANGE UP (ref 0–0)
PHOSPHATE SERPL-MCNC: 4 MG/DL — SIGNIFICANT CHANGE UP (ref 2.5–4.5)
PLATELET # BLD AUTO: 307 K/UL — SIGNIFICANT CHANGE UP (ref 150–400)
POTASSIUM SERPL-MCNC: 4.2 MMOL/L — SIGNIFICANT CHANGE UP (ref 3.5–5.3)
POTASSIUM SERPL-SCNC: 4.2 MMOL/L — SIGNIFICANT CHANGE UP (ref 3.5–5.3)
RBC # BLD: 4.68 M/UL — SIGNIFICANT CHANGE UP (ref 3.8–5.2)
RBC # FLD: 20.7 % — HIGH (ref 10.3–14.5)
SODIUM SERPL-SCNC: 134 MMOL/L — LOW (ref 135–145)
WBC # BLD: 6.27 K/UL — SIGNIFICANT CHANGE UP (ref 3.8–10.5)
WBC # FLD AUTO: 6.27 K/UL — SIGNIFICANT CHANGE UP (ref 3.8–10.5)

## 2019-06-11 PROCEDURE — 99232 SBSQ HOSP IP/OBS MODERATE 35: CPT

## 2019-06-11 PROCEDURE — 99358 PROLONG SERVICE W/O CONTACT: CPT

## 2019-06-11 RX ADMIN — Medication 1 MILLIGRAM(S): at 13:31

## 2019-06-11 RX ADMIN — HEPARIN SODIUM 5000 UNIT(S): 5000 INJECTION INTRAVENOUS; SUBCUTANEOUS at 17:10

## 2019-06-11 RX ADMIN — Medication 64.8 MILLIGRAM(S): at 04:00

## 2019-06-11 RX ADMIN — HEPARIN SODIUM 5000 UNIT(S): 5000 INJECTION INTRAVENOUS; SUBCUTANEOUS at 05:13

## 2019-06-11 RX ADMIN — Medication 1 TABLET(S): at 13:38

## 2019-06-11 RX ADMIN — PANTOPRAZOLE SODIUM 40 MILLIGRAM(S): 20 TABLET, DELAYED RELEASE ORAL at 13:41

## 2019-06-11 RX ADMIN — HALOPERIDOL DECANOATE 5 MILLIGRAM(S): 100 INJECTION INTRAMUSCULAR at 13:22

## 2019-06-11 NOTE — PROGRESS NOTE BEHAVIORAL HEALTH - NSBHFUPINTERVALHXFT_PSY_A_CORE
Patient is slowly getting better - more alert, more calm, with residual confusion and intermittent agitation as expected. Patient's sister Shyla ([phone]) and mother Andrez (home # [phone]; shared cell w/  # [phone]) at bedside - private conversation held. Mother and daughter reported that they live in [place] [facility] [place] [facility] [place] and Patient has been in [place] for years and is overall very "secretive." She does not fully disclose to them everything that happens in her life. For example, patient had a previous admission for a seizure at another hospital in addition to [facility] and the doctor's called them to let them know. This time, Patient's radha called the mother after she saw patient have a seizure. Pt's boyfriend at this time is in [place] and Patient calls him on his phone. As per family, they think Patient is socializing with a "bad crowd" who encourages her ongoing alcohol use. Patient at this time is being evicted from the room she is renting and her mother and sister have to go to collect her things and documents like passport etc. Patient's alcohol abuse has been ongoing for numerous years and led to Pt's losing her job and then her condo. Family has asked her many times to come back to [place] and live with them. Patient has no history of psychiatric admissions/ suicide attempts/aggression/violence/primary psychiatric diagnosis. Patient has a long hx of refusing to attend alcohol rehab/detox/counseling services. Patient also has a long history of not filling prescriptions and following up with doctors. Patient is slowly getting better - more alert, more calm, with residual confusion and intermittent agitation as expected. Patient's sister Shyla ([phone]) and mother Andrez (home # [phone]; shared cell w/  # [phone]) at bedside - 40 min private conversation held. Mother and daughter reported that they live in [facility] and Patient has been in [place] for years and is overall very "secretive." She does not fully disclose to them everything that happens in her life. For example, patient had a previous admission for a seizure at another hospital in addition to [facility] and the doctor's called them to let them know. This time, Patient's landlady called the mother after she saw patient have a seizure. Pt's boyfriend at this time is in [place] and Patient calls him on his phone. As per family, they think Patient is socializing with a "bad crowd" who encourages her ongoing alcohol use. Patient at this time is being evicted from the room she is renting and her mother and sister have to go to collect her things and documents like passport etc. Patient's alcohol abuse has been ongoing for numerous years and led to Pt's losing her job and then her condo. Family has asked her many times to come back to [place] and live with them. Patient has no history of psychiatric admissions/ suicide attempts/aggression/violence/primary psychiatric diagnosis. Patient has a long hx of refusing to attend alcohol rehab/detox/counseling services. Patient also has a long history of not filling prescriptions and following up with doctors.

## 2019-06-11 NOTE — PROGRESS NOTE ADULT - SUBJECTIVE AND OBJECTIVE BOX
Patient is a 48y old  Female who presents with a chief complaint of Seizure (10 Gaudencio 2019 13:28)      SUBJECTIVE/OBJECTIVE:    Patient confused and restless.  Family at bedside.    MEDICATIONS  (STANDING):  folic acid 1 milliGRAM(s) Oral daily  heparin  Injectable 5000 Unit(s) SubCutaneous every 12 hours  multivitamin 1 Tablet(s) Oral daily  pantoprazole    Tablet 40 milliGRAM(s) Oral before breakfast    MEDICATIONS  (PRN):  acetaminophen   Tablet .. 650 milliGRAM(s) Oral every 6 hours PRN Mild Pain (1 - 3)  haloperidol    Injectable 5 milliGRAM(s) IV Push every 6 hours PRN agitation  ibuprofen  Tablet. 400 milliGRAM(s) Oral every 6 hours PRN Moderate Pain (4 - 6)  PHENobarbital Injectable 130 milliGRAM(s) IV Push every 15 minutes PRN severe agitation/CIWA 8-20  polyethylene glycol 3350 17 Gram(s) Oral daily PRN Constipation      Allergies    No Known Allergies    Intolerances          Vital Signs Last 24 Hrs  T(C): 36.8 (11 Jun 2019 11:11), Max: 36.8 (11 Jun 2019 06:36)  T(F): 98.3 (11 Jun 2019 11:11), Max: 98.3 (11 Jun 2019 06:36)  HR: 87 (11 Jun 2019 11:11) (87 - 95)  BP: 127/71 (11 Jun 2019 11:11) (107/69 - 127/71)  BP(mean): --  RR: 16 (11 Jun 2019 11:11) (15 - 16)  SpO2: 100% (11 Jun 2019 11:11) (99% - 100%)    PHYSICAL EXAM:  GENERAL: NAD, well-groomed, well-developed  HEAD:  Atraumatic, Normocephalic  EYES: EOMI, PERRLA, conjunctiva and sclera clear  ENMT: No tonsillar erythema, exudates, or enlargement; Moist mucous membranes, No lesions  NECK: Supple, No JVD, Normal thyroid  NERVOUS SYSTEM:  Alert ; Motor Strength 5/5; No tremors  CHEST/LUNG: Clear bilaterally; No rales, rhonchi, wheezing, or rubs  HEART: Regular rate and rhythm; No murmurs, rubs, or gallops  ABDOMEN: Soft, Nontender, Nondistended; Bowel sounds present  EXTREMITIES:  2+ Peripheral Pulses, No clubbing, cyanosis, or edema  LYMPH: No lymphadenopathy noted  SKIN: No rashes or lesions    LABS:                        10.9   6.27  )-----------( 307      ( 11 Jun 2019 07:24 )             35.3     06-11    134<L>  |  98  |  18  ----------------------------<  109<H>  4.2   |  26  |  0.83    Ca    10.4<H>      11 Jun 2019 07:24  Phos  4.0     06-11  Mg     2.1     06-11    TPro  8.2  /  Alb  3.3  /  TBili  0.4  /  DBili  x   /  AST  23  /  ALT  28  /  AlkPhos  96  06-10        CAPILLARY BLOOD GLUCOSE              RADIOLOGY & ADDITIONAL TESTS:        Consultant(s) Notes Reviewed:  [ xx] YES  [ ] NO

## 2019-06-11 NOTE — PROGRESS NOTE BEHAVIORAL HEALTH - SUMMARY
- will need several more days for sensorium to clear   - still has NO capacity to make medical decisions; medical decision maker is Mom Andrez home # [phone]; shared cell w/  # [phone] (came to [place] and is here at this time)   - finishing phenobarb taper; should not be having any alcohol withdrawal symptoms as it has been effectively managed  - PRN haldol for behavioral agitation management (try to minimize use)  - Patient needs to be awake and alert in order for discharge planning to commence  - once more cooperative and calm, would get PT eval given long hospital stay / lying in bed

## 2019-06-12 LAB
ANION GAP SERPL CALC-SCNC: 9 MMOL/L — SIGNIFICANT CHANGE UP (ref 5–17)
BUN SERPL-MCNC: 21 MG/DL — SIGNIFICANT CHANGE UP (ref 7–23)
CALCIUM SERPL-MCNC: 10.3 MG/DL — HIGH (ref 8.5–10.1)
CHLORIDE SERPL-SCNC: 98 MMOL/L — SIGNIFICANT CHANGE UP (ref 96–108)
CO2 SERPL-SCNC: 26 MMOL/L — SIGNIFICANT CHANGE UP (ref 22–31)
CREAT SERPL-MCNC: 0.72 MG/DL — SIGNIFICANT CHANGE UP (ref 0.5–1.3)
GLUCOSE SERPL-MCNC: 88 MG/DL — SIGNIFICANT CHANGE UP (ref 70–99)
HCT VFR BLD CALC: 36.9 % — SIGNIFICANT CHANGE UP (ref 34.5–45)
HGB BLD-MCNC: 11.2 G/DL — LOW (ref 11.5–15.5)
MAGNESIUM SERPL-MCNC: 2.2 MG/DL — SIGNIFICANT CHANGE UP (ref 1.6–2.6)
MCHC RBC-ENTMCNC: 23.1 PG — LOW (ref 27–34)
MCHC RBC-ENTMCNC: 30.4 GM/DL — LOW (ref 32–36)
MCV RBC AUTO: 76.1 FL — LOW (ref 80–100)
NRBC # BLD: 0 /100 WBCS — SIGNIFICANT CHANGE UP (ref 0–0)
PHOSPHATE SERPL-MCNC: 4.7 MG/DL — HIGH (ref 2.5–4.5)
PLATELET # BLD AUTO: 307 K/UL — SIGNIFICANT CHANGE UP (ref 150–400)
POTASSIUM SERPL-MCNC: 4.5 MMOL/L — SIGNIFICANT CHANGE UP (ref 3.5–5.3)
POTASSIUM SERPL-SCNC: 4.5 MMOL/L — SIGNIFICANT CHANGE UP (ref 3.5–5.3)
RBC # BLD: 4.85 M/UL — SIGNIFICANT CHANGE UP (ref 3.8–5.2)
RBC # FLD: 20.2 % — HIGH (ref 10.3–14.5)
SODIUM SERPL-SCNC: 133 MMOL/L — LOW (ref 135–145)
WBC # BLD: 5.83 K/UL — SIGNIFICANT CHANGE UP (ref 3.8–10.5)
WBC # FLD AUTO: 5.83 K/UL — SIGNIFICANT CHANGE UP (ref 3.8–10.5)

## 2019-06-12 PROCEDURE — 70551 MRI BRAIN STEM W/O DYE: CPT | Mod: 26

## 2019-06-12 PROCEDURE — 99233 SBSQ HOSP IP/OBS HIGH 50: CPT

## 2019-06-12 RX ORDER — SODIUM CHLORIDE 9 MG/ML
2 INJECTION INTRAMUSCULAR; INTRAVENOUS; SUBCUTANEOUS ONCE
Refills: 0 | Status: COMPLETED | OUTPATIENT
Start: 2019-06-12 | End: 2019-06-12

## 2019-06-12 RX ADMIN — HEPARIN SODIUM 5000 UNIT(S): 5000 INJECTION INTRAVENOUS; SUBCUTANEOUS at 05:34

## 2019-06-12 RX ADMIN — Medication 1 MILLIGRAM(S): at 12:16

## 2019-06-12 RX ADMIN — HEPARIN SODIUM 5000 UNIT(S): 5000 INJECTION INTRAVENOUS; SUBCUTANEOUS at 17:31

## 2019-06-12 RX ADMIN — SODIUM CHLORIDE 2 GRAM(S): 9 INJECTION INTRAMUSCULAR; INTRAVENOUS; SUBCUTANEOUS at 12:16

## 2019-06-12 RX ADMIN — Medication 1 TABLET(S): at 12:16

## 2019-06-12 RX ADMIN — PANTOPRAZOLE SODIUM 40 MILLIGRAM(S): 20 TABLET, DELAYED RELEASE ORAL at 07:54

## 2019-06-12 NOTE — PHYSICAL THERAPY INITIAL EVALUATION ADULT - BALANCE TRAINING, PT EVAL
Patient will be normal in static and dynamic standing balance with rolling walker in 2 weeks to improve safety and decrease risk of falls.

## 2019-06-12 NOTE — PHYSICAL THERAPY INITIAL EVALUATION ADULT - GAIT DEVIATIONS NOTED, PT EVAL
scissoring gait/decreased ryland/decreased weight-shifting ability/decreased step length/decreased stride length

## 2019-06-12 NOTE — PROGRESS NOTE BEHAVIORAL HEALTH - OTHER
much improved deferred at this time improving much less slowed "better" more reactive more linear , improving

## 2019-06-12 NOTE — PROGRESS NOTE ADULT - SUBJECTIVE AND OBJECTIVE BOX
Patient is a 48y old  Female who presents with a chief complaint of Seizure (11 Jun 2019 13:23)      SUBJECTIVE/OBJECTIVE:    Resting comfortably    MEDICATIONS  (STANDING):  folic acid 1 milliGRAM(s) Oral daily  heparin  Injectable 5000 Unit(s) SubCutaneous every 12 hours  multivitamin 1 Tablet(s) Oral daily  pantoprazole    Tablet 40 milliGRAM(s) Oral before breakfast    MEDICATIONS  (PRN):  acetaminophen   Tablet .. 650 milliGRAM(s) Oral every 6 hours PRN Mild Pain (1 - 3)  haloperidol    Injectable 5 milliGRAM(s) IV Push every 6 hours PRN agitation  ibuprofen  Tablet. 400 milliGRAM(s) Oral every 6 hours PRN Moderate Pain (4 - 6)  PHENobarbital Injectable 130 milliGRAM(s) IV Push every 15 minutes PRN severe agitation/CIWA 8-20  polyethylene glycol 3350 17 Gram(s) Oral daily PRN Constipation      Allergies    No Known Allergies    Intolerances          Vital Signs Last 24 Hrs  T(C): 35.9 (12 Jun 2019 11:32), Max: 37.1 (11 Jun 2019 17:00)  T(F): 96.7 (12 Jun 2019 11:32), Max: 98.7 (11 Jun 2019 17:00)  HR: 74 (12 Jun 2019 11:32) (67 - 82)  BP: 115/78 (12 Jun 2019 11:32) (95/64 - 115/78)  BP(mean): --  RR: 17 (12 Jun 2019 11:32) (15 - 17)  SpO2: 98% (12 Jun 2019 11:32) (97% - 100%)    PHYSICAL EXAM:  GENERAL: NAD, well-groomed, well-developed  HEAD:  Atraumatic, Normocephalic  EYES: EOMI, PERRLA, conjunctiva and sclera clear  ENMT: No tonsillar erythema, exudates, or enlargement; Moist mucous membranes, No lesions  NECK: Supple, No JVD, Normal thyroid  NERVOUS SYSTEM:  Awake ; Motor Strength 5/5   CHEST/LUNG: Clear bilaterally; No rales, rhonchi, wheezing, or rubs  HEART: Regular rate and rhythm; No murmurs, rubs, or gallops  ABDOMEN: Soft, Nontender, Nondistended; Bowel sounds present  EXTREMITIES:  2+ Peripheral Pulses, No clubbing, cyanosis, or edema  LYMPH: No lymphadenopathy noted  SKIN: No rashes or lesions    LABS:                        11.2   5.83  )-----------( 307      ( 12 Jun 2019 06:30 )             36.9     06-12    133<L>  |  98  |  21  ----------------------------<  88  4.5   |  26  |  0.72    Ca    10.3<H>      12 Jun 2019 06:30  Phos  4.7     06-12  Mg     2.2     06-12          CAPILLARY BLOOD GLUCOSE              RADIOLOGY & ADDITIONAL TESTS:        Consultant(s) Notes Reviewed:  [xxx ] YES  [ ] NO

## 2019-06-12 NOTE — PROGRESS NOTE BEHAVIORAL HEALTH - SUMMARY
- MRI of head ordered (discussed with Pt and family who all agreed) as Patient is c/o "triple vision," has hx of seizure, etc to rule out any intracranial pathology  - PT consult ordered in light of LE weakness and prolonged hospital stay / bed bound   - sensorium is getting much better; Patient still has not regained full capacity but is getting there. Her decision maker at this time is her mother Andrez  - continue CO 1:1 for fall risk / resolving confusion

## 2019-06-12 NOTE — PHYSICAL THERAPY INITIAL EVALUATION ADULT - PERSONAL SAFETY AND JUDGMENT, REHAB EVAL
impaired/at risk behaviors demonstrated/impulsive, patient appearing to have short attention span and perseverative on items in her bag, leaning forward and attempting to stand up to  small pieces of paper from floor

## 2019-06-12 NOTE — PROGRESS NOTE BEHAVIORAL HEALTH - PERCEPTIONS
Pt with large circular redden dry area to lateral left thigh, possible/suspected fungal rash. No abnormalities

## 2019-06-12 NOTE — PROGRESS NOTE BEHAVIORAL HEALTH - NSBHFUPINTERVALHXFT_PSY_A_CORE
Patient is much better and is continuing to show clinical progress. She is calm, cooperative, friendly, engages much better. Family at bedside and are returning to [place] tomorrow. Patient c/o of "triple" vision; reports she has not had an eye exam for many years (if ever) and has to cover one eye to read while bringing reading material very close to her face to be able to see. She also asked when she can "get out of here." Reports that she feels LE weakness and unsteadiness on her feet. Need for an MRI discussed w/ Patient and family (who actually asked for head imaging before) and PT consult. Everyone agreed.

## 2019-06-13 LAB
ALBUMIN SERPL ELPH-MCNC: 3.5 G/DL — SIGNIFICANT CHANGE UP (ref 3.3–5)
ALP SERPL-CCNC: 92 U/L — SIGNIFICANT CHANGE UP (ref 40–120)
ALT FLD-CCNC: 25 U/L — SIGNIFICANT CHANGE UP (ref 12–78)
ANION GAP SERPL CALC-SCNC: 9 MMOL/L — SIGNIFICANT CHANGE UP (ref 5–17)
AST SERPL-CCNC: 18 U/L — SIGNIFICANT CHANGE UP (ref 15–37)
BILIRUB SERPL-MCNC: 0.2 MG/DL — SIGNIFICANT CHANGE UP (ref 0.2–1.2)
BUN SERPL-MCNC: 23 MG/DL — SIGNIFICANT CHANGE UP (ref 7–23)
CALCIUM SERPL-MCNC: 10.5 MG/DL — HIGH (ref 8.5–10.1)
CHLORIDE SERPL-SCNC: 98 MMOL/L — SIGNIFICANT CHANGE UP (ref 96–108)
CO2 SERPL-SCNC: 27 MMOL/L — SIGNIFICANT CHANGE UP (ref 22–31)
CREAT SERPL-MCNC: 0.68 MG/DL — SIGNIFICANT CHANGE UP (ref 0.5–1.3)
GLUCOSE SERPL-MCNC: 88 MG/DL — SIGNIFICANT CHANGE UP (ref 70–99)
HCT VFR BLD CALC: 35.3 % — SIGNIFICANT CHANGE UP (ref 34.5–45)
HGB BLD-MCNC: 10.8 G/DL — LOW (ref 11.5–15.5)
MAGNESIUM SERPL-MCNC: 2.3 MG/DL — SIGNIFICANT CHANGE UP (ref 1.6–2.6)
MCHC RBC-ENTMCNC: 23.4 PG — LOW (ref 27–34)
MCHC RBC-ENTMCNC: 30.6 GM/DL — LOW (ref 32–36)
MCV RBC AUTO: 76.4 FL — LOW (ref 80–100)
NRBC # BLD: 0 /100 WBCS — SIGNIFICANT CHANGE UP (ref 0–0)
PHOSPHATE SERPL-MCNC: 4.5 MG/DL — SIGNIFICANT CHANGE UP (ref 2.5–4.5)
PLATELET # BLD AUTO: 305 K/UL — SIGNIFICANT CHANGE UP (ref 150–400)
POTASSIUM SERPL-MCNC: 4.5 MMOL/L — SIGNIFICANT CHANGE UP (ref 3.5–5.3)
POTASSIUM SERPL-SCNC: 4.5 MMOL/L — SIGNIFICANT CHANGE UP (ref 3.5–5.3)
PROT SERPL-MCNC: 8.7 GM/DL — HIGH (ref 6–8.3)
RBC # BLD: 4.62 M/UL — SIGNIFICANT CHANGE UP (ref 3.8–5.2)
RBC # FLD: 19.9 % — HIGH (ref 10.3–14.5)
SODIUM SERPL-SCNC: 134 MMOL/L — LOW (ref 135–145)
WBC # BLD: 5.43 K/UL — SIGNIFICANT CHANGE UP (ref 3.8–10.5)
WBC # FLD AUTO: 5.43 K/UL — SIGNIFICANT CHANGE UP (ref 3.8–10.5)

## 2019-06-13 PROCEDURE — 93010 ELECTROCARDIOGRAM REPORT: CPT

## 2019-06-13 RX ORDER — PANTOPRAZOLE SODIUM 20 MG/1
1 TABLET, DELAYED RELEASE ORAL
Qty: 0 | Refills: 0 | DISCHARGE
Start: 2019-06-13

## 2019-06-13 RX ORDER — DOCUSATE SODIUM 100 MG
100 CAPSULE ORAL DAILY
Refills: 0 | Status: DISCONTINUED | OUTPATIENT
Start: 2019-06-13 | End: 2019-06-14

## 2019-06-13 RX ORDER — FOLIC ACID 0.8 MG
1 TABLET ORAL
Qty: 0 | Refills: 0 | DISCHARGE
Start: 2019-06-13

## 2019-06-13 RX ADMIN — Medication 100 MILLIGRAM(S): at 11:31

## 2019-06-13 RX ADMIN — Medication 1 MILLIGRAM(S): at 11:31

## 2019-06-13 RX ADMIN — HEPARIN SODIUM 5000 UNIT(S): 5000 INJECTION INTRAVENOUS; SUBCUTANEOUS at 17:38

## 2019-06-13 RX ADMIN — Medication 1 TABLET(S): at 11:31

## 2019-06-13 RX ADMIN — HEPARIN SODIUM 5000 UNIT(S): 5000 INJECTION INTRAVENOUS; SUBCUTANEOUS at 05:58

## 2019-06-13 RX ADMIN — PANTOPRAZOLE SODIUM 40 MILLIGRAM(S): 20 TABLET, DELAYED RELEASE ORAL at 07:34

## 2019-06-13 NOTE — PROGRESS NOTE ADULT - PROBLEM SELECTOR PLAN 4
Replacement

## 2019-06-13 NOTE — PROGRESS NOTE ADULT - PROBLEM SELECTOR PROBLEM 3
Alcohol use

## 2019-06-13 NOTE — DISCHARGE NOTE PROVIDER - CARE PROVIDER_API CALL
Calista [place])  [place] [address]  Phone: [phone]  Fax: [phone]  Follow Up Time: 2 weeks

## 2019-06-13 NOTE — PROGRESS NOTE ADULT - PROBLEM SELECTOR PLAN 5
Downgraded.  - Continue with mvi, thiamine, folic acid  - Start phenobarb 65mg Q8H PO , for agitation can give phenobarb 130mg q15min until calm; phenobarb 260mg IVP for severe agitation and/or CIWA >20  - Continue with haldol as Qtc <500; will start PRN haldol to assess if better response than phenobarb (unclear if this is truly withdrawal symptoms, or behavioral agitation from being hospitalized)
Monitoring in ICU.  - Continue with mvi, thiamine, folic acid  - Start phenobarb 130 Q6H, for agitation can give phenobarb 130mg q15min until calm; phenobarb 260mg IVP for severe agitation and/or CIWA >20  - Precedex to RASS -1-0  - seizure precautions- Continue with mvi, thiamine, folic acid

## 2019-06-13 NOTE — PROGRESS NOTE ADULT - PROVIDER SPECIALTY LIST ADULT
Critical Care
Internal Medicine
Critical Care
Internal Medicine
Internal Medicine

## 2019-06-13 NOTE — PROGRESS NOTE ADULT - SUBJECTIVE AND OBJECTIVE BOX
Patient is a 48y old  Female who presents with a chief complaint of Seizure (12 Jun 2019 12:35)      SUBJECTIVE/OBJECTIVE:    Without complaints. Patient resting comfortably.   Alert, and appropriate     MEDICATIONS  (STANDING):  docusate sodium 100 milliGRAM(s) Oral daily  folic acid 1 milliGRAM(s) Oral daily  heparin  Injectable 5000 Unit(s) SubCutaneous every 12 hours  multivitamin 1 Tablet(s) Oral daily  pantoprazole    Tablet 40 milliGRAM(s) Oral before breakfast    MEDICATIONS  (PRN):  acetaminophen   Tablet .. 650 milliGRAM(s) Oral every 6 hours PRN Mild Pain (1 - 3)  haloperidol    Injectable 5 milliGRAM(s) IV Push every 6 hours PRN agitation  ibuprofen  Tablet. 400 milliGRAM(s) Oral every 6 hours PRN Moderate Pain (4 - 6)  PHENobarbital Injectable 130 milliGRAM(s) IV Push every 15 minutes PRN severe agitation/CIWA 8-20  polyethylene glycol 3350 17 Gram(s) Oral daily PRN Constipation      Allergies    No Known Allergies    Intolerances          Vital Signs Last 24 Hrs  T(C): 36.1 (13 Jun 2019 17:12), Max: 37.1 (12 Jun 2019 23:00)  T(F): 97 (13 Jun 2019 17:12), Max: 98.8 (12 Jun 2019 23:00)  HR: 67 (13 Jun 2019 17:12) (62 - 80)  BP: 113/76 (13 Jun 2019 17:12) (102/46 - 124/77)  BP(mean): --  RR: 18 (13 Jun 2019 17:12) (16 - 18)  SpO2: 100% (13 Jun 2019 17:12) (99% - 100%)    PHYSICAL EXAM:  GENERAL: NAD, well-groomed, well-developed  HEAD:  Atraumatic, Normocephalic  EYES: EOMI, PERRLA, conjunctiva and sclera clear  ENMT: No tonsillar erythema, exudates, or enlargement; Moist mucous membranes, No lesions  NECK: Supple, No JVD, Normal thyroid  NERVOUS SYSTEM:  Alert ; Motor Strength 4/5   CHEST/LUNG: Clear bilaterally; No rales, rhonchi, wheezing, or rubs  HEART: Regular rate and rhythm; No murmurs, rubs, or gallops  ABDOMEN: Soft, Nontender, Nondistended; Bowel sounds present  EXTREMITIES:  2+ Peripheral Pulses, No clubbing, cyanosis, or edema  LYMPH: No lymphadenopathy noted  SKIN: No rashes or lesions    LABS:                        10.8   5.43  )-----------( 305      ( 13 Jun 2019 06:46 )             35.3     06-13    134<L>  |  98  |  23  ----------------------------<  88  4.5   |  27  |  0.68    Ca    10.5<H>      13 Jun 2019 06:46  Phos  4.5     06-13  Mg     2.3     06-13    TPro  8.7<H>  /  Alb  3.5  /  TBili  0.2  /  DBili  x   /  AST  18  /  ALT  25  /  AlkPhos  92  06-13        CAPILLARY BLOOD GLUCOSE              RADIOLOGY & ADDITIONAL TESTS:    < from: MR Head No Cont (06.12.19 @ 16:05) >    EXAM:  MR BRAIN                            PROCEDURE DATE:  06/12/2019          INTERPRETATION:  INDICATION:    Visual disturbance  TECHNIQUE:  Multiplanar brain imaging was conducted using a 1.5 Elana   magnet.  T1, T2 and FLAIR imaging was performed.  In addition, diffusion   imaging, diffusion coefficient assessment (ADC) and echo planar T2* was   incorporated. No contrast administered    COMPARISON EXAMINATION:  CT 6/1/2019    FINDINGS:  HEMISPHERES:  No diffusion restriction or acute ischemia is noted. No   space-occupying lesion is identified. No demyelinating foci are suggested.  VENTRICLES:  midline and normal in size  POSTERIOR FOSSA:  The brain stem and cerebellum are unremarkable in   appearance.  No CP angle abnormality is noted.  EXTRA-AXIAL:  No mass or collections are depicted.  CRANIAL NERVES:  No abnormality noted.  VASCULATURE:  The major vessels and venous sinuses are grossly patent.    SINUSES AND MASTOIDS:  Clear.  MISCELLANEOUS:  No orbital or pituitary abnormalitynoted.  No skull base   lesion suggested.    IMPRESSION:      1)  unremarkable MRI study of the brain..  2)  clear sinuses and mastoids. No intraorbital abnormality..           < end of copied text >      Consultant(s) Notes Reviewed:  [xxx ] YES  [ ] NO

## 2019-06-13 NOTE — PROGRESS NOTE ADULT - PROBLEM SELECTOR PLAN 2
Ativan protocol increased.  Received dose of Phenobarb and prn in CIWA >15, consult ICU.  IVF  MVI, Folate, Thiamine  Monitoring CWA
Ativan protocol increased.  Received dose of Phenobarb.  IVF  MVI, Folate, Thiamine  Monitoring CWA
Ativan protocol  IVF  MVI, Folate, Thiamine  Monitoring CWA
Ativan protocol increased.  Received dose of Phenobarb and prn in CIWA >15, consult ICU.  IVF  MVI, Folate, Thiamine  Monitoring CWA
Ativan protocol increased.  Received dose of Phenobarb and prn in CIWA >15, consult ICU.  IVF  MVI, Folate, Thiamine  Monitoring CWA  Improved
Ativan protocol increased.  Received dose of Phenobarb.  IVF  MVI, Folate, Thiamine  Monitoring CWA

## 2019-06-13 NOTE — DISCHARGE NOTE PROVIDER - HOSPITAL COURSE
49yo, BF with PMH of Alcohol Use, otherwise denies other medical issues, presents with Sz today and yesterday.  Patient  has had prior seizure in past as well but not on any meds - states may have been related to not drinking alcohol. Pt states last drink was 2 days ago. Unable to give amount of daily use.  Patient had sz in ER.  Denies CP, SOB, cough, palpitation, n/v/d, fever, chills, weakness, abdominal pain, dysuria.     Admitted for Sz 2/2 Alcohol Withdrawal, treated with IVF, Ativan protocol, monitoring, followed by Psych.  Hospital course complicated by the development of DT,s with CIWA    18, transferred to ICU, treated with Phenobarb protocol and sedation, stablized and downgraded.  On floor Ativan tapered, patient improved, cleared by    Psych and discharged to [facility]. 49yo, BF with PMH of Alcohol Use, otherwise denies other medical issues, presents with Sz today and yesterday.  Patient  has had prior seizure in past as well but not on any meds - states may have been related to not drinking alcohol. Pt states last drink was 2 days ago. Unable to give amount of daily use.  Patient had sz in ER.  Denies CP, SOB, cough, palpitation, n/v/d, fever, chills, weakness, abdominal pain, dysuria.     Admitted for Sz 2/2 Alcohol Withdrawal, treated with IVF, Ativan protocol, monitoring, followed by Psych.  Hospital course complicated by the development of DT,s with CIWA    18, transferred to ICU, treated with Phenobarb protocol and sedation, stablized and downgraded.  On floor Ativan tapered, patient improved, cleared by    Psych and discharged with Home Care, PT.

## 2019-06-13 NOTE — PROGRESS NOTE ADULT - ASSESSMENT
47 yo female with h/o etoh abuse admitted for withdrawal seizures.  Course of hospitalization complicated by multiple RRTs/code gray for acute agitation +/- elevated CIWA scores.  Pt to be transferred to ICU for high dose phenobarb with precedex infusion. Patient noted to be delirious and agitated on the floor.  Given 1 time dose of phenobarb with minimal effect.  Will transfer to ICU for further management ETOH delirium    ETOH withdrawal  - Continue with mvi, thiamine, folic acid  - Start phenobarb 130 Q6H, for agitation can give phenobarb 130mg q15min until calm; phenobarb 260mg IVP for severe agitation and/or CIWA >20  - Precedex to RASS -1-0  - seizure precautions    Prolonged QT - hold any haldol, repeat ekg daily, correct electrolyte abnormalities, K>4 and Mg>2    GERD - Continue protonix    Gen: HSQ for dvt prophylaxis .    Critical Care Time 30 Minutes
Admitted for Sz due to Alcohol Use , Alcohol Withdrawal and Hypomagnesia
47 yo female with h/o etoh abuse admitted for withdrawal seizures.  Course of hospitalization complicated by multiple RRTs/code gray for acute agitation +/- elevated CIWA scores.  Pt to be transferred to ICU for high dose phenobarb with precedex infusion. Patient noted to be delirious and agitated on the floor.  Given 1 time dose of phenobarb with minimal effect.  Will transfer to ICU for further management ETOH delirium    ETOH withdrawal  - Continue with mvi, thiamine, folic acid  - Start phenobarb 65mg Q6H PO, for agitation can give phenobarb 130mg q15min until calm; phenobarb 260mg IVP for severe agitation and/or CIWA >20  - Precedex to RASS -1-0  - seizure precautions    Prolonged QT - now resolved, will continue daily EKGs, and will started haldol as per recs from psych, monitor QT every shift, correct electrolyte abnormalities, K>4 and Mg>2    GERD - Continue protonix    Gen: HSQ for dvt prophylaxis .    Critical Care Time 30 Minutes
49 yo female with h/o etoh abuse admitted for withdrawal seizures.  Course of hospitalization complicated by multiple RRTs/code gray for acute agitation +/- elevated CIWA scores.  Pt to be transferred to ICU for high dose phenobarb with precedex infusion. Patient noted to be delirious and agitated on the floor.  Given 1 time dose of phenobarb with minimal effect.  Will transfer to ICU for further management ETOH delirium    ETOH withdrawal  - Continue with mvi, thiamine, folic acid  - Required additional doses of phenobarb.  Increased dosing phenobarb 130 Q4H PO with taper ordered. Will hold for sedation.     - Precedex to RASS -1-0  - seizure precautions    Prolonged QT -  today, repeat ekg daily, correct electrolyte abnormalities, K>4 and Mg>2    GERD - Continue protonix    Gen: HSQ for dvt prophylaxis .    Critical Care Time 30 Minutes
49 yo female with h/o etoh abuse admitted for withdrawal seizures.  Course of hospitalization complicated by multiple RRTs/code gray for acute agitation +/- elevated CIWA scores.  Pt to be transferred to ICU for high dose phenobarb with precedex infusion. Patient noted to be delirious and agitated on the floor.  Given 1 time dose of phenobarb with minimal effect.  Will transfer to ICU for further management ETOH delirium    ETOH withdrawal  - Continue with mvi, thiamine, folic acid  - Start phenobarb 65mg Q8H PO today, for agitation can give phenobarb 130mg q15min until calm; phenobarb 260mg IVP for severe agitation and/or CIWA >20  - Continue with haldol as Qtc <500; will start PRN haldol to assess if better response than phenobarb (unclear if this is truly withdrawal symptoms, or behavioral agitation from being hospitalized)  - Precedex to RASS -1-0  - seizure precautions  - No longer requiring precedex.      Prolonged QT - now resolved, will continue daily EKGs, and will started haldol as per recs from psych, monitor QT every shift, correct electrolyte abnormalities, K>4 and Mg>2    GERD - Continue protonix    Gen: HSQ for dvt prophylaxis .    Critical Care Time 30 Minutes
Admitted for Sz due to Alcohol Use , Alcohol Withdrawal and Hypomagnesia

## 2019-06-13 NOTE — PROGRESS NOTE BEHAVIORAL HEALTH - OTHER
much improved deferred at this time much less slowed "better" more reactive more linear , improving improving

## 2019-06-13 NOTE — PROGRESS NOTE BEHAVIORAL HEALTH - NSBHFUPINTERVALHXFT_PSY_A_CORE
Interval events: PT evaluated Patient and recommended rehabilitation facility; subacute rehab. MRI of head was unremarkable study. Interval events: PT evaluated Patient and recommended rehabilitation facility; subacute rehab. MRI of head was unremarkable study. Patient is ambulating with a walker around her room. Patient has no complaints and reports she continues to feel better with each day. At this time, she acknowledges that she still feels weak and needs to gain her strength back and is amicable to go to a subacute rehab. Same clinical presentation - maintaining her progress, no longer needs any PRNs and she has been calm, cooperative, friendly, engages much better.

## 2019-06-13 NOTE — PROGRESS NOTE ADULT - PROBLEM SELECTOR PROBLEM 5
Alcohol withdrawal seizure, with delirium

## 2019-06-13 NOTE — PROGRESS NOTE ADULT - PROBLEM SELECTOR PROBLEM 4
Hypomagnesemia

## 2019-06-13 NOTE — PROGRESS NOTE BEHAVIORAL HEALTH - SUMMARY
NEW RECOMMENDATIONS:  - proceed with discharge planning to a subacute rehab. Patient agreeable to going  - maintain CO 1;1 for fall risk at this time until Patient learns how to ambulate with a walker more effectively

## 2019-06-13 NOTE — PROGRESS NOTE ADULT - ATTENDING COMMENTS
Continue current care and treatment.
Phenobarb/Ativan taper completed  Haldol prn  Monitoring CIWA  Psych noted;  - PT consult ordered in light of LE weakness and prolonged hospital stay / bed bound   - sensorium is getting much better; Patient still has not regained full capacity but is getting there. Her decision maker at this time is her mother Andrez  - continue CO 1:1 for fall risk / resolving confusion.   1:1  Continue current care and treatment.
Ativan 2mg IVP prn  Continue current care and treatment.
Ativan 2mg IVP prn  Continue current care and treatment.
Continue current care and treatment.  Continue Ativan protocol
Continue Phenobarb taper  Haldol prn  Monitoring CIWA  Psych noted;  1:1  Continue current care and treatment.
Continue Phenobarb taper  Monitoring CIWA  Psych following  1:1  Continue current care and treatment.
Management per ICU
Phenobarb/Ativan taper completed  Haldol prn  Monitoring CIWA  Psych noted;   no psychiatric contraindications to discharge   proceed to discharge planning / transfer to [facility]  Discharge planning in process.      Continue current care and treatment.

## 2019-06-13 NOTE — PROGRESS NOTE ADULT - PROBLEM SELECTOR PROBLEM 2
Alcohol withdrawal syndrome with complication

## 2019-06-13 NOTE — PROGRESS NOTE BEHAVIORAL HEALTH - ORIENTATION OTHER
unable to ascertain at this time with high clinical certainty
needs cuing for time which is expected
unable to ascertain at this time with high clinical certainty
unable to ascertain at this time with high clinical certainty
needs cuing for time which is expected
unable to ascertain at this time with high clinical certainty
unable to ascertain at this time

## 2019-06-13 NOTE — DISCHARGE NOTE PROVIDER - NSDCCPCAREPLAN_GEN_ALL_CORE_FT
PRINCIPAL DISCHARGE DIAGNOSIS  Diagnosis: Alcohol withdrawal seizure, with delirium  Assessment and Plan of Treatment: No drinking  AA  Medical foolowup.      SECONDARY DISCHARGE DIAGNOSES  Diagnosis: Alcohol abuse, continuous drinking behavior  Assessment and Plan of Treatment: As above    Diagnosis: Hypomagnesemia  Assessment and Plan of Treatment:   Monitoring

## 2019-06-14 VITALS
OXYGEN SATURATION: 100 % | RESPIRATION RATE: 17 BRPM | DIASTOLIC BLOOD PRESSURE: 73 MMHG | HEART RATE: 69 BPM | SYSTOLIC BLOOD PRESSURE: 119 MMHG | TEMPERATURE: 97 F

## 2019-06-14 LAB
ANION GAP SERPL CALC-SCNC: 7 MMOL/L — SIGNIFICANT CHANGE UP (ref 5–17)
BUN SERPL-MCNC: 21 MG/DL — SIGNIFICANT CHANGE UP (ref 7–23)
CALCIUM SERPL-MCNC: 9.8 MG/DL — SIGNIFICANT CHANGE UP (ref 8.5–10.1)
CHLORIDE SERPL-SCNC: 101 MMOL/L — SIGNIFICANT CHANGE UP (ref 96–108)
CO2 SERPL-SCNC: 27 MMOL/L — SIGNIFICANT CHANGE UP (ref 22–31)
CREAT SERPL-MCNC: 0.63 MG/DL — SIGNIFICANT CHANGE UP (ref 0.5–1.3)
FERRITIN SERPL-MCNC: 16 NG/ML — SIGNIFICANT CHANGE UP (ref 15–150)
GLUCOSE SERPL-MCNC: 91 MG/DL — SIGNIFICANT CHANGE UP (ref 70–99)
HCT VFR BLD CALC: 32.5 % — LOW (ref 34.5–45)
HGB BLD-MCNC: 9.8 G/DL — LOW (ref 11.5–15.5)
IRON SATN MFR SERPL: 23 UG/DL — LOW (ref 30–160)
MAGNESIUM SERPL-MCNC: 2.1 MG/DL — SIGNIFICANT CHANGE UP (ref 1.6–2.6)
MCHC RBC-ENTMCNC: 23 PG — LOW (ref 27–34)
MCHC RBC-ENTMCNC: 30.2 GM/DL — LOW (ref 32–36)
MCV RBC AUTO: 76.1 FL — LOW (ref 80–100)
NRBC # BLD: 0 /100 WBCS — SIGNIFICANT CHANGE UP (ref 0–0)
PHOSPHATE SERPL-MCNC: 4.3 MG/DL — SIGNIFICANT CHANGE UP (ref 2.5–4.5)
PLATELET # BLD AUTO: 305 K/UL — SIGNIFICANT CHANGE UP (ref 150–400)
POTASSIUM SERPL-MCNC: 4.1 MMOL/L — SIGNIFICANT CHANGE UP (ref 3.5–5.3)
POTASSIUM SERPL-SCNC: 4.1 MMOL/L — SIGNIFICANT CHANGE UP (ref 3.5–5.3)
RBC # BLD: 4.27 M/UL — SIGNIFICANT CHANGE UP (ref 3.8–5.2)
RBC # FLD: 19.6 % — HIGH (ref 10.3–14.5)
SODIUM SERPL-SCNC: 135 MMOL/L — SIGNIFICANT CHANGE UP (ref 135–145)
WBC # BLD: 5.41 K/UL — SIGNIFICANT CHANGE UP (ref 3.8–10.5)
WBC # FLD AUTO: 5.41 K/UL — SIGNIFICANT CHANGE UP (ref 3.8–10.5)

## 2019-06-14 RX ORDER — FERROUS SULFATE 325(65) MG
1 TABLET ORAL
Qty: 90 | Refills: 0
Start: 2019-06-14 | End: 2019-07-13

## 2019-06-14 RX ORDER — FOLIC ACID 0.8 MG
1 TABLET ORAL
Qty: 90 | Refills: 0
Start: 2019-06-14 | End: 2019-09-11

## 2019-06-14 RX ORDER — PANTOPRAZOLE SODIUM 20 MG/1
1 TABLET, DELAYED RELEASE ORAL
Qty: 30 | Refills: 0
Start: 2019-06-14 | End: 2019-07-13

## 2019-06-14 RX ADMIN — HEPARIN SODIUM 5000 UNIT(S): 5000 INJECTION INTRAVENOUS; SUBCUTANEOUS at 06:07

## 2019-06-14 RX ADMIN — Medication 100 MILLIGRAM(S): at 11:38

## 2019-06-14 RX ADMIN — PANTOPRAZOLE SODIUM 40 MILLIGRAM(S): 20 TABLET, DELAYED RELEASE ORAL at 07:39

## 2019-06-14 RX ADMIN — Medication 1 MILLIGRAM(S): at 11:38

## 2019-06-14 RX ADMIN — Medication 1 TABLET(S): at 11:38

## 2019-06-14 NOTE — PROGRESS NOTE BEHAVIORAL HEALTH - NSBHFUPINTERVALHXFT_PSY_A_CORE
Patient sitting in her room reviewing paperwork her mother and sister brought her. She is calm, cooperative and fully engages. Patient has no complaints and reports she continues to feel better with each day. At this time, she acknowledges that she still feels weak and needs to gain her strength back and is amicable to receiving PT (going to stay with a friend). Same clinical presentation - maintaining her progress, no longer needs any PRNs and regained capacity at this time. Patient endorses stable euthymic mood, improved sleep / appetite / energy level / concentration . Denies any symptoms of hypomania/mary/psychosis/major depression/ anxiety/panic. Denies any active or passive suicidal or homicidal ideation. Names protective factors (edgardo; family; hope for future). Denies access ti guns ro weapons. Extensive psychoeducation done with Patient about abstaining from alcohol and attending outpatient substance abuse counseling, going for a vision test and following up with a Neurologist as recommended. Patient said "definitely."

## 2019-06-14 NOTE — PROGRESS NOTE BEHAVIORAL HEALTH - SUMMARY
NEW RECOMMENDATIONS:  - Patient regained capacity at this time and can make her medical decisions including engaging in discharge planning  - routine checks  - signing off

## 2019-06-14 NOTE — PROGRESS NOTE BEHAVIORAL HEALTH - NSBHPTASSESSDT_PSY_A_CORE
05-Jun-2019
06-Jun-2019
07-Jun-2019
08-Jun-2019 09:40
13-Jun-2019
14-Jun-2019
11-Jun-2019
10-Gaudencio-2019
12-Jun-2019

## 2019-06-14 NOTE — PROGRESS NOTE BEHAVIORAL HEALTH - NSBHCONSFOLLOWNEEDS_PSY_A_CORE
Patient needs further psychiatric safety assessment prior to discharge
no psychiatric contraindications to discharge
Patient needs further psychiatric safety assessment prior to discharge
no psychiatric contraindications to discharge
Patient needs further psychiatric safety assessment prior to discharge

## 2019-06-14 NOTE — PROGRESS NOTE BEHAVIORAL HEALTH - PRIMARY DX
Alcohol withdrawal seizure, with delirium

## 2019-06-14 NOTE — PROGRESS NOTE BEHAVIORAL HEALTH - NSBHCONSULTOBSREASON_PSY_A_CORE FT
fall risk
fall risk, seizure risk; confusion, agitation
fall risk, seizure risk; confusion, agitation
fall risk, seizure risk
fall risk, seizure risk; confusion, agitation
fall risk
fall risk, seizure risk; confusion, agitation
fall risk, seizure risk; confusion, agitation
fall risk, seizure risk

## 2019-06-14 NOTE — PROGRESS NOTE BEHAVIORAL HEALTH - NSBHCONSULTFOLLOWAFTERCARE_PSY_A_CORE FT
proceed to discharge planning
proceed to discharge planning / transfer to [facility]

## 2019-06-14 NOTE — PROGRESS NOTE BEHAVIORAL HEALTH - NSBHLOC_PSY_A_CORE
Alert
Alert
Lethargic, arousable to verbal stimulus
Lethargic, arousable to painful stimulus
Alert
Other
Alert
Alert
Lethargic, arousable to verbal stimulus

## 2019-06-14 NOTE — PROGRESS NOTE BEHAVIORAL HEALTH - NSBHATTESTSEENBY_PSY_A_CORE
attending Psychiatrist without NP/Trainee
NP with telephonic supervision from Attending Psychiatrist
attending Psychiatrist without NP/Trainee

## 2019-06-14 NOTE — PROGRESS NOTE BEHAVIORAL HEALTH - NSBHCHARTREVIEWVS_PSY_A_CORE FT
Temp (F): 96.7 Degrees F; HR 74; /78; RR 17; SpO2 (%) SpO2 (%): 98 %
Heart Rate Heart Rate (beats/min): 101 /min; /73; RR 20; SpO2 (%) SpO2 (%): 88 %
Heart Rate Heart Rate (beats/min): 80; /46; RR 18; SpO2 (%) SpO2 (%): 99 %; O2 delivery Patient On: room air
HR: 76 /min; BP 98/63; RR 16; SpO2 (%) : 100 %
Temp (F): 97 Degrees F; HR 69; /73; RR 17; SpO2 (%) SpO2 (%): 100 %
Temp (F): 96.8 Degrees F; HR 84; /79; RR 16 /min; SpO2 (%) SpO2 (%): 100 %
Temp (F): 98.6 Degrees F; HR 89; /69; RR 17 /min; SpO2 (%) SpO2 (%): 100 %
Heart Rate Heart Rate (beats/min): 64 /min; /64; RR 15 /min; SpO2 (%) SpO2 (%): 100 %
Temp (F): 98.3 Degrees F; HR 87; /71; RR 16 /min; SpO2 (%) SpO2 (%): 100 %

## 2019-06-14 NOTE — CHART NOTE - NSCHARTNOTEFT_GEN_A_CORE
Notified by RN pt with critical lab value K 2.9, further review shows Mg 1.4  Pt received IV Mag (4g) and IV KCl (10meq) earlier in ED  Pt currently in bed, agitated and intoxicated, has removed IV access x 3, incontinent of urine and feces.  Pt difficult to redirect    As pt without consistent IV access, will change stat supplementation from IV and PO to PO only.  -Recheck with aml  - RN will attempt IV access when pt calmer
Pt c seizures du to ETOH abuse; confused, agitated, not able to respond to verbal discussion; on 1:1 constant observation     Factors impacting intake: [ ] none [ ] nausea  [ ] vomiting [ ] diarrhea [ ] constipation  [ ]chewing problems [ ] swallowing issues  [X ] other: AMS, decreased appetite    Diet Prescription: Diet, Regular:   Supplement Feeding Modality:  Oral  Ensure Enlive Cans or Servings Per Day:  1       Frequency:  Two Times a day (06-06-19 @ 14:47)    Intake:  20 - 70% range but mostly < 50% of meals consumed; per 1:1 aide pt consuming very little but likes Ensure supplement    Current Weight:  69.9 kg (6/10); previous wt 71.1 kg (6/6)  % Weight Change:  2% wt loss x 4 days; 4.3% loss since admission (6/1)    Physical Appearance:  edema remains at 1+ generalized edema    Pertinent Medications: MEDICATIONS  (STANDING):  folic acid 1 milliGRAM(s) Oral daily  heparin  Injectable 5000 Unit(s) SubCutaneous every 12 hours  multivitamin 1 Tablet(s) Oral daily  pantoprazole    Tablet 40 milliGRAM(s) Oral before breakfast  PHENobarbital   Oral   PHENobarbital 64.8 milliGRAM(s) Oral every 12 hours    MEDICATIONS  (PRN):  acetaminophen   Tablet .. 650 milliGRAM(s) Oral every 6 hours PRN Mild Pain (1 - 3)  haloperidol    Injectable 5 milliGRAM(s) IV Push every 6 hours PRN agitation  ibuprofen  Tablet. 400 milliGRAM(s) Oral every 6 hours PRN Moderate Pain (4 - 6)  PHENobarbital Injectable 130 milliGRAM(s) IV Push every 15 minutes PRN severe agitation/CIWA 8-20  polyethylene glycol 3350 17 Gram(s) Oral daily PRN Constipation    Pertinent Labs: 06-10 Na137 mmol/L Glu 84 mg/dL K+ 4.1 mmol/L Cr  0.76 mg/dL BUN 12 mg/dL 06-10 Phos 4.3 mg/dL 06-10 Alb 3.3 g/dL    Skin: WDL    Estimated Needs:   [ ] no change since previous assessment  (6/6)  [ ] recalculated:     Previous Nutrition Diagnosis:   [X ] Severe Malnutrition - acute  Etiology:  Inadequate energy/protein intake related to ETOH abuse c seizures  Sign & Symptoms:  < 50% nutrition needs + 4.3% wt loss x 10 days    GOAL:  Pt to consume > 75% meals/supplements; maintain wt +/- 2# during hospitalization    Nutrition Diagnosis is [X ] ongoing  [ ] resolved [ ] not applicable     New Nutrition Diagnosis: [X ] not applicable    Interventions:   continue Regular diet  Recommend  [ ] Change Diet To:  [X ] Nutrition Supplement: Ensure Enlive x 3/day (provides 1050 kcal, 60 g protein)   [ ] Nutrition Support  [ ] Other:     Monitoring and Evaluation:   [X ] PO intake [ x ] Tolerance to diet prescription [ x ] weights [ x ] labs[ x ] follow up per protocol  [ ] other:
Pt w/ seizures ; Alcohol withdrawal syndrome w/ complication    Factors impacting intake: [x ] none [ ] nausea  [ ] vomiting [ ] diarrhea [ ] constipation  [ ]chewing problems [ ] swallowing issues  [ ] other:     6/10 - Diet Prescription: Diet, Regular:   Supplement Feeding Modality:  Oral  Ensure Enlive Cans or Servings Per Day:  1       Frequency:  Three Times a day (06-10-19 @ 15:16)    Intake: 75% meals & 100 % ensure enlive 8 oz three times per day (1050 annika, 60 gm pro)     Current Weight: 6/13 - 160.4 (72.8 kg) no edema noted ; 6/10 - 154.1 (69.9 kg) no edema noted  % Weight Change - 3.9 % / 2.9 kg gain x 3 days    Nutrition focused physical exam conducted ;   Subcutaneous fat loss: [wdl ] Orbital fat pads region, [wdl ]Buccal fat region, [wdl ]Triceps region,  [wdl ]Ribs region.  Muscle wasting: [wdl ]Temples region, wdl ]Clavicle region, [wdl ]Shoulder region, [wdl ]Scapula region, [wdl ]Interosseous region,  [ wdl]thigh region, [wdl ]Calf region      Pertinent Medications: MEDICATIONS  (STANDING):  docusate sodium 100 milliGRAM(s) Oral daily  folic acid 1 milliGRAM(s) Oral daily  heparin  Injectable 5000 Unit(s) SubCutaneous every 12 hours  multivitamin 1 Tablet(s) Oral daily  pantoprazole    Tablet 40 milliGRAM(s) Oral before breakfast    MEDICATIONS  (PRN):  acetaminophen   Tablet .. 650 milliGRAM(s) Oral every 6 hours PRN Mild Pain (1 - 3)  haloperidol    Injectable 5 milliGRAM(s) IV Push every 6 hours PRN agitation  ibuprofen  Tablet. 400 milliGRAM(s) Oral every 6 hours PRN Moderate Pain (4 - 6)  PHENobarbital Injectable 130 milliGRAM(s) IV Push every 15 minutes PRN severe agitation/CIWA 8-20  polyethylene glycol 3350 17 Gram(s) Oral daily PRN Constipation    Pertinent Labs: 06-14 Na135 mmol/L Glu 91 mg/dL K+ 4.1 mmol/L Cr  0.63 mg/dL BUN 21 mg/dL 06-14 Phos 4.3 mg/dL 06-13 Alb 3.5 g/dL     CAPILLARY BLOOD GLUCOSE      Skin: WDL    Estimated Needs:   [x ] no change since previous assessment ( 6/6//2019 )  [ ] recalculated:     Previous Nutrition Diagnosis:   [X ] Severe Malnutrition - acute  Etiology:  Inadequate energy/protein intake related to ETOH abuse c seizures  Sign & Symptoms:  < 50% nutrition needs + 4.3% wt loss x 10 days    GOAL:  Pt to consume > 75% meals/supplements; maintain wt +/- 2# during hospitalization -> Goals Met       Nutrition Diagnosis is [x ] ongoing  [ ] resolved [ ] not applicable     New Nutrition Diagnosis: [x ] not applicable       Interventions:   Recommend  [ x ] Continue current Diet as Rx'd  [ ] Change Diet To:  [ ] Nutrition Supplement  [ ] Nutrition Support  [ ] Other:     Monitoring and Evaluation:   [ ] PO intake [ x ] Tolerance to diet prescription [ x ] weights [ x ] labs[ x ] follow up per protocol  [ ] other:
Upon Nutritional Assessment by the Registered Dietitian your patient was determined to meet criteria / has evidence of the following diagnosis/diagnoses:          [ ]  Mild Protein Calorie Malnutrition        [ ]  Moderate Protein Calorie Malnutrition        [x ] Severe Protein Calorie Malnutrition        [ ] Unspecified Protein Calorie Malnutrition        [ ] Underweight / BMI <19        [ ] Morbid Obesity / BMI > 40      Findings as based on:  •  Comprehensive nutrition assessment and consultation  •  Calorie counts (nutrient intake analysis)  •  Food acceptance and intake status from observations by staff  •  Follow up  •  Patient education  •  Intervention secondary to interdisciplinary rounds  •   concerns      Treatment:    The following diet has been recommended:  Regular/Ensure Enlive 2 x day(700kcal & 40gm protein)    PROVIDER Section:     By signing this assessment you are acknowledging and agree with the diagnosis/diagnoses assigned by the Registered Dietitian    Comments:
47 yo female with h/o etoh abuse admitted for withdrawal seizures.  Course of hospitalization complicated by multiple RRTs/code gray for acute agitation +/- elevated CIWA scores.  Pt to be transferred to ICU for high dose phenobarb with precedex infusion. Patient noted to be delirious and agitated on the floor.  Given 1 time dose of phenobarb with minimal effect.  Transferredn to ICU for further management ETOH delirium    Pt managed with PO phenobarb taper and haldol as per edson. Pt off precedex gtt for over 24hs and is HD stable and can be transferred to medical/surgical floor     pt signed out to Dr. Lombardo

## 2019-06-14 NOTE — DISCHARGE NOTE NURSING/CASE MANAGEMENT/SOCIAL WORK - NSDCDPATPORTLINK_GEN_ALL_CORE
You can access the [facility], offered by [facility], by registering with the following website: [email or web address]

## 2019-06-14 NOTE — PROGRESS NOTE BEHAVIORAL HEALTH - SECONDARY DX1
Alcohol abuse, continuous drinking behavior

## 2019-06-14 NOTE — PROGRESS NOTE BEHAVIORAL HEALTH - AXIS III
alcohol withdrawal seizure (onset 05/15; received Keppra)

## 2019-06-14 NOTE — PROGRESS NOTE BEHAVIORAL HEALTH - NSBHFUPREASONCONS_PSY_A_CORE
alcohol
alcohol
capacity/agitation/med management
delerium/capacity/agitation/med management
delerium/capacity/med management/agitation
med management/agitation/alcohol
alcohol/med management/agitation
delerium/agitation/med management
med management/agitation

## 2019-06-14 NOTE — PROGRESS NOTE BEHAVIORAL HEALTH - ABNORMAL MOVEMENTS
Other
No abnormal movements
No abnormal movements
Other
Other
No abnormal movements
Other

## 2019-06-14 NOTE — PROGRESS NOTE BEHAVIORAL HEALTH - AFFECT CONGRUENCE
Not congruent
Congruent
Other
Not congruent
Congruent
Not congruent
Not congruent
Congruent
Congruent

## 2019-06-14 NOTE — PROGRESS NOTE BEHAVIORAL HEALTH - RISK ASSESSMENT
Chronic risk factors: single, long history of medical treatment and medication noncompliance, long hx of chronic alcohol abuse, family lives far; lost her job and condo due to her substance abuse; long hx of refusing to attend detox/rehab/substance abuse treatment. Protective factors: female gender; has no history of psychiatric hospitalizations, no known hx of suicide attempts; no known hx of self-injurious behavior; no hx of aggression/violence; no known hx of illicit substance abuse; no legal issues; strong family support.
To be done once Patient is awake, alert, oriented and able to engage in meaningful conversation.
To be done once Patient is awake, alert, oriented and able to engage in meaningful conversation.
to be done once Patient is awake, alert, oriented and able to engage in meaningful conversation
to be done once Patient is awake, alert, oriented and able to engage in meaningful conversation
Chronic risk factors: single, long history of medical treatment and medication noncompliance, long hx of chronic alcohol abuse, family lives far; lost her job and condo due to her substance abuse; long hx of refusing to attend detox/rehab/substance abuse treatment. Protective factors: female gender; has no history of psychiatric hospitalizations, no known hx of suicide attempts; no known hx of self-injurious behavior; no hx of aggression/violence; no known hx of illicit substance abuse; no legal issues; strong family support.
To be done once Patient is awake, alert, oriented and able to engage in meaningful conversation.
To be done once Patient is awake, alert, oriented and able to engage in meaningful conversation.
to be done once Patient is awake, alert, oriented and able to engage in meaningful conversation

## 2019-06-18 DIAGNOSIS — K21.9 GASTRO-ESOPHAGEAL REFLUX DISEASE WITHOUT ESOPHAGITIS: ICD-10-CM

## 2019-06-18 DIAGNOSIS — E83.42 HYPOMAGNESEMIA: ICD-10-CM

## 2019-06-18 DIAGNOSIS — R32 UNSPECIFIED URINARY INCONTINENCE: ICD-10-CM

## 2019-06-18 DIAGNOSIS — R15.9 FULL INCONTINENCE OF FECES: ICD-10-CM

## 2019-06-18 DIAGNOSIS — E43 UNSPECIFIED SEVERE PROTEIN-CALORIE MALNUTRITION: ICD-10-CM

## 2019-06-18 DIAGNOSIS — R56.9 UNSPECIFIED CONVULSIONS: ICD-10-CM

## 2019-06-18 DIAGNOSIS — F10.221 ALCOHOL DEPENDENCE WITH INTOXICATION DELIRIUM: ICD-10-CM

## 2019-06-18 DIAGNOSIS — F10.231 ALCOHOL DEPENDENCE WITH WITHDRAWAL DELIRIUM: ICD-10-CM

## 2020-09-22 ENCOUNTER — INPATIENT (INPATIENT)
Facility: HOSPITAL | Age: 50
LOS: 0 days | Discharge: HOME | End: 2020-09-23
Attending: INTERNAL MEDICINE | Admitting: INTERNAL MEDICINE
Payer: SELF-PAY

## 2020-09-22 VITALS
HEIGHT: 64 IN | RESPIRATION RATE: 18 BRPM | SYSTOLIC BLOOD PRESSURE: 128 MMHG | HEART RATE: 106 BPM | DIASTOLIC BLOOD PRESSURE: 88 MMHG | OXYGEN SATURATION: 98 % | TEMPERATURE: 99 F | WEIGHT: 160.06 LBS

## 2020-09-22 DIAGNOSIS — E83.42 HYPOMAGNESEMIA: ICD-10-CM

## 2020-09-22 DIAGNOSIS — F10.230 ALCOHOL DEPENDENCE WITH WITHDRAWAL, UNCOMPLICATED: ICD-10-CM

## 2020-09-22 DIAGNOSIS — F10.10 ALCOHOL ABUSE, UNCOMPLICATED: ICD-10-CM

## 2020-09-22 DIAGNOSIS — R94.31 ABNORMAL ELECTROCARDIOGRAM [ECG] [EKG]: ICD-10-CM

## 2020-09-22 LAB
ALBUMIN SERPL ELPH-MCNC: 4.1 G/DL — SIGNIFICANT CHANGE UP (ref 3.3–5)
ALP SERPL-CCNC: 102 U/L — SIGNIFICANT CHANGE UP (ref 40–120)
ALT FLD-CCNC: 100 U/L — HIGH (ref 12–78)
AMPHET UR-MCNC: NEGATIVE — SIGNIFICANT CHANGE UP
ANION GAP SERPL CALC-SCNC: 18 MMOL/L — HIGH (ref 5–17)
APPEARANCE UR: ABNORMAL
AST SERPL-CCNC: 195 U/L — HIGH (ref 15–37)
BACTERIA # UR AUTO: ABNORMAL
BARBITURATES UR SCN-MCNC: NEGATIVE — SIGNIFICANT CHANGE UP
BASOPHILS # BLD AUTO: 0.03 K/UL — SIGNIFICANT CHANGE UP (ref 0–0.2)
BASOPHILS NFR BLD AUTO: 0.5 % — SIGNIFICANT CHANGE UP (ref 0–2)
BENZODIAZ UR-MCNC: NEGATIVE — SIGNIFICANT CHANGE UP
BILIRUB SERPL-MCNC: 1.6 MG/DL — HIGH (ref 0.2–1.2)
BILIRUB UR-MCNC: ABNORMAL
BUN SERPL-MCNC: 7 MG/DL — SIGNIFICANT CHANGE UP (ref 7–23)
CALCIUM SERPL-MCNC: 9.3 MG/DL — SIGNIFICANT CHANGE UP (ref 8.5–10.1)
CHLORIDE SERPL-SCNC: 94 MMOL/L — LOW (ref 96–108)
CK SERPL-CCNC: 350 U/L — HIGH (ref 26–192)
CO2 SERPL-SCNC: 26 MMOL/L — SIGNIFICANT CHANGE UP (ref 22–31)
COCAINE METAB.OTHER UR-MCNC: NEGATIVE — SIGNIFICANT CHANGE UP
COLOR SPEC: ABNORMAL
COMMENT - URINE: SIGNIFICANT CHANGE UP
CREAT SERPL-MCNC: 1.14 MG/DL — SIGNIFICANT CHANGE UP (ref 0.5–1.3)
DIFF PNL FLD: ABNORMAL
EOSINOPHIL # BLD AUTO: 0 K/UL — SIGNIFICANT CHANGE UP (ref 0–0.5)
EOSINOPHIL NFR BLD AUTO: 0 % — SIGNIFICANT CHANGE UP (ref 0–6)
EPI CELLS # UR: ABNORMAL
ETHANOL SERPL-MCNC: <10 MG/DL — SIGNIFICANT CHANGE UP (ref 0–10)
GLUCOSE SERPL-MCNC: 193 MG/DL — HIGH (ref 70–99)
GLUCOSE UR QL: NEGATIVE MG/DL — SIGNIFICANT CHANGE UP
GRAN CASTS # UR COMP ASSIST: ABNORMAL /LPF
HCG SERPL-ACNC: <1 MIU/ML — SIGNIFICANT CHANGE UP
HCT VFR BLD CALC: 39.9 % — SIGNIFICANT CHANGE UP (ref 34.5–45)
HGB BLD-MCNC: 13.6 G/DL — SIGNIFICANT CHANGE UP (ref 11.5–15.5)
HYALINE CASTS # UR AUTO: ABNORMAL /LPF
IMM GRANULOCYTES NFR BLD AUTO: 0.7 % — SIGNIFICANT CHANGE UP (ref 0–1.5)
KETONES UR-MCNC: ABNORMAL
LACTATE SERPL-SCNC: 10 MMOL/L — CRITICAL HIGH (ref 0.7–2)
LACTATE SERPL-SCNC: 3.1 MMOL/L — HIGH (ref 0.7–2)
LEUKOCYTE ESTERASE UR-ACNC: ABNORMAL
LYMPHOCYTES # BLD AUTO: 1.05 K/UL — SIGNIFICANT CHANGE UP (ref 1–3.3)
LYMPHOCYTES # BLD AUTO: 18.1 % — SIGNIFICANT CHANGE UP (ref 13–44)
MAGNESIUM SERPL-MCNC: 1.3 MG/DL — LOW (ref 1.6–2.6)
MCHC RBC-ENTMCNC: 31.6 PG — SIGNIFICANT CHANGE UP (ref 27–34)
MCHC RBC-ENTMCNC: 34.1 GM/DL — SIGNIFICANT CHANGE UP (ref 32–36)
MCV RBC AUTO: 92.8 FL — SIGNIFICANT CHANGE UP (ref 80–100)
METHADONE UR-MCNC: NEGATIVE — SIGNIFICANT CHANGE UP
MONOCYTES # BLD AUTO: 0.55 K/UL — SIGNIFICANT CHANGE UP (ref 0–0.9)
MONOCYTES NFR BLD AUTO: 9.5 % — SIGNIFICANT CHANGE UP (ref 2–14)
NEUTROPHILS # BLD AUTO: 4.12 K/UL — SIGNIFICANT CHANGE UP (ref 1.8–7.4)
NEUTROPHILS NFR BLD AUTO: 71.2 % — SIGNIFICANT CHANGE UP (ref 43–77)
NITRITE UR-MCNC: POSITIVE
NRBC # BLD: 0 /100 WBCS — SIGNIFICANT CHANGE UP (ref 0–0)
OPIATES UR-MCNC: NEGATIVE — SIGNIFICANT CHANGE UP
PCP SPEC-MCNC: SIGNIFICANT CHANGE UP
PCP UR-MCNC: NEGATIVE — SIGNIFICANT CHANGE UP
PH UR: 5 — SIGNIFICANT CHANGE UP (ref 5–8)
PLATELET # BLD AUTO: 166 K/UL — SIGNIFICANT CHANGE UP (ref 150–400)
POTASSIUM SERPL-MCNC: 3.1 MMOL/L — LOW (ref 3.5–5.3)
POTASSIUM SERPL-SCNC: 3.1 MMOL/L — LOW (ref 3.5–5.3)
PROT SERPL-MCNC: 8.3 GM/DL — SIGNIFICANT CHANGE UP (ref 6–8.3)
PROT UR-MCNC: 500 MG/DL
RBC # BLD: 4.3 M/UL — SIGNIFICANT CHANGE UP (ref 3.8–5.2)
RBC # FLD: 14.6 % — HIGH (ref 10.3–14.5)
RBC CASTS # UR COMP ASSIST: ABNORMAL /HPF (ref 0–4)
SARS-COV-2 RNA SPEC QL NAA+PROBE: SIGNIFICANT CHANGE UP
SODIUM SERPL-SCNC: 138 MMOL/L — SIGNIFICANT CHANGE UP (ref 135–145)
SP GR SPEC: 1.02 — SIGNIFICANT CHANGE UP (ref 1.01–1.02)
THC UR QL: NEGATIVE — SIGNIFICANT CHANGE UP
TROPONIN I SERPL-MCNC: <.015 NG/ML — SIGNIFICANT CHANGE UP (ref 0.01–0.04)
UROBILINOGEN FLD QL: 4 MG/DL
WBC # BLD: 5.79 K/UL — SIGNIFICANT CHANGE UP (ref 3.8–10.5)
WBC # FLD AUTO: 5.79 K/UL — SIGNIFICANT CHANGE UP (ref 3.8–10.5)
WBC UR QL: ABNORMAL

## 2020-09-22 PROCEDURE — 99285 EMERGENCY DEPT VISIT HI MDM: CPT

## 2020-09-22 PROCEDURE — 99223 1ST HOSP IP/OBS HIGH 75: CPT

## 2020-09-22 PROCEDURE — 93010 ELECTROCARDIOGRAM REPORT: CPT

## 2020-09-22 PROCEDURE — 70450 CT HEAD/BRAIN W/O DYE: CPT | Mod: 26

## 2020-09-22 RX ORDER — FOLIC ACID 0.8 MG
1 TABLET ORAL DAILY
Refills: 0 | Status: DISCONTINUED | OUTPATIENT
Start: 2020-09-22 | End: 2020-09-23

## 2020-09-22 RX ORDER — POTASSIUM CHLORIDE 20 MEQ
40 PACKET (EA) ORAL ONCE
Refills: 0 | Status: COMPLETED | OUTPATIENT
Start: 2020-09-22 | End: 2020-09-22

## 2020-09-22 RX ORDER — ONDANSETRON 8 MG/1
4 TABLET, FILM COATED ORAL ONCE
Refills: 0 | Status: COMPLETED | OUTPATIENT
Start: 2020-09-22 | End: 2020-09-22

## 2020-09-22 RX ORDER — MAGNESIUM SULFATE 500 MG/ML
2 VIAL (ML) INJECTION ONCE
Refills: 0 | Status: COMPLETED | OUTPATIENT
Start: 2020-09-22 | End: 2020-09-22

## 2020-09-22 RX ORDER — SODIUM CHLORIDE 9 MG/ML
1000 INJECTION INTRAMUSCULAR; INTRAVENOUS; SUBCUTANEOUS ONCE
Refills: 0 | Status: COMPLETED | OUTPATIENT
Start: 2020-09-22 | End: 2020-09-22

## 2020-09-22 RX ORDER — PANTOPRAZOLE SODIUM 20 MG/1
40 TABLET, DELAYED RELEASE ORAL
Refills: 0 | Status: DISCONTINUED | OUTPATIENT
Start: 2020-09-22 | End: 2020-09-23

## 2020-09-22 RX ORDER — FERROUS SULFATE 325(65) MG
325 TABLET ORAL DAILY
Refills: 0 | Status: DISCONTINUED | OUTPATIENT
Start: 2020-09-22 | End: 2020-09-23

## 2020-09-22 RX ADMIN — Medication 1 TABLET(S): at 12:06

## 2020-09-22 RX ADMIN — Medication 1 MILLIGRAM(S): at 12:06

## 2020-09-22 RX ADMIN — Medication 40 MILLIEQUIVALENT(S): at 09:46

## 2020-09-22 RX ADMIN — Medication 1 MILLIGRAM(S): at 08:47

## 2020-09-22 RX ADMIN — Medication 50 GRAM(S): at 09:46

## 2020-09-22 RX ADMIN — SODIUM CHLORIDE 1000 MILLILITER(S): 9 INJECTION INTRAMUSCULAR; INTRAVENOUS; SUBCUTANEOUS at 08:35

## 2020-09-22 RX ADMIN — ONDANSETRON 4 MILLIGRAM(S): 8 TABLET, FILM COATED ORAL at 08:45

## 2020-09-22 RX ADMIN — Medication 325 MILLIGRAM(S): at 12:06

## 2020-09-22 NOTE — H&P ADULT - NSHPPHYSICALEXAM_GEN_ALL_CORE
ICU Vital Signs Last 24 Hrs  T(C): 37.1 (22 Sep 2020 08:11), Max: 37.1 (22 Sep 2020 08:11)  T(F): 98.8 (22 Sep 2020 08:11), Max: 98.8 (22 Sep 2020 08:11)  HR: 106 (22 Sep 2020 08:11) (106 - 106)  BP: 128/88 (22 Sep 2020 08:11) (128/88 - 128/88)  BP(mean): --  ABP: --  ABP(mean): --  RR: 18 (22 Sep 2020 08:11) (18 - 18)  SpO2: 98% (22 Sep 2020 08:11) (98% - 98%) ICU Vital Signs Last 24 Hrs  T(C): 37.1 (22 Sep 2020 08:11), Max: 37.1 (22 Sep 2020 08:11)  T(F): 98.8 (22 Sep 2020 08:11), Max: 98.8 (22 Sep 2020 08:11)  HR: 106 (22 Sep 2020 08:11) (106 - 106)  BP: 128/88 (22 Sep 2020 08:11) (128/88 - 128/88)  BP(mean): --  ABP: --  ABP(mean): --  RR: 18 (22 Sep 2020 08:11) (18 - 18)  SpO2: 98% (22 Sep 2020 08:11) (98% - 98%)    GENERAL: NAD well-developed  HEAD:  Atraumatic, Normocephalic  EYES: EOMI, PERRLA, conjunctiva and sclera clear  ENMT: No tonsillar erythema, exudates, or enlargement; Moist mucous membranes, Good dentition, No lesions  NECK: Supple, No JVD, Normal thyroid  NERVOUS SYSTEM:  Alert & Oriented X3, Good concentration; Motor Strength 5/5 B/L upper and lower extremities; DTRs 2+ intact and symmetric  CHEST/LUNG: Clear to percussion bilaterally; No rales, rhonchi, wheezing, or rubs  HEART: Regular rate and rhythm; No murmurs, rubs, or gallops  ABDOMEN: Soft, Nontender, Nondistended; Bowel sounds present  EXTREMITIES:  2+ Peripheral Pulses, No clubbing, cyanosis, or edema  LYMPH: No lymphadenopathy   SKIN: No rashes or lesions

## 2020-09-22 NOTE — SBIRT NOTE ADULT - NSSBIRTALCPASSREFTXDET_GEN_A_CORE
Provided SBIRT services: Full screen positive. Referral to Treatment Attempted. Screening results were reviewed with the patient and patient was provided information about healthy guidelines and potential negative consequences associated with level of risk. Motivation and readiness to reduce or stop use was discussed and goals and activities to make changes were suggested/offered. Referral to treatment was not provided due to Pt being in need of further medical attention. Pt was provided a list of substance abuse treatment centers in her community and enrolled in [facility].

## 2020-09-22 NOTE — ED ADULT NURSE NOTE - ED STAT RN HANDOFF DETAILS
received pt from Kwaku OROZCO RN for continued care in ED HOLD. awaiting bed placement.  pt is awake alert and in no apparent distress.  Seizure precautions in place. ctm

## 2020-09-22 NOTE — ED ADULT NURSE NOTE - CHPI ED NUR SYMPTOMS NEG
no blurred vision/no loss of consciousness/no weakness/no confusion/no change in level of consciousness/no numbness

## 2020-09-22 NOTE — ED PROVIDER NOTE - OBJECTIVE STATEMENT
51yo female with pmh alcohol abuse and alcohol withdrawal seizures/DT presents with ? seizure. 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 Per ems, it sounded like a seizure. She was post ictal with EMS. Pt currently AOx3, but does not know what  happened. No tongue pain/urinary/bowel incontinence. Pt reports last drink was 3 days ago. denies recent illness, fever, chest pain, sob, dizziness, headache    No fever/chills, No photophobia/eye pain/changes in vision, No ear pain/sore throat/dysphagia, No chest pain/palpitations, no SOB/cough, no wheeze/stridor, No abdominal pain, No N/V/D, no dysuria/frequency/discharge, No neck/back pain, no rash, + seizure

## 2020-09-22 NOTE — ED PROVIDER NOTE - CLINICAL SUMMARY MEDICAL DECISION MAKING FREE TEXT BOX
Pt currently comfortable, though give prior history of DTs will admit. d/w dr ballesteros Pt currently comfortable, though give prior history of DTs will admit. d/w dr ballesteros  lactate noted, was not sent out of concern for sepsis/ischemia but rather a surrogate for ?seizure. given it is elevated, pt likely had a seizure. pt admitted and will repeat after fluids to ensure it is trending down appropriately.

## 2020-09-22 NOTE — ED ADULT NURSE NOTE - OBJECTIVE STATEMENT
pt received from EMS , ALERT AND ORIENTED X3, PT AS PER EMS HAD A WITNESS SIZURE, UNKNOWN LENGht or trype of seizure , as per patient , this is the 2nd time she has had a seizure . Pt admits she has never taken seizure medications, pt admits she drinks daily and last drink was 3 days ago. Juliawa completed see floww sheet .

## 2020-09-22 NOTE — H&P ADULT - HISTORY OF PRESENT ILLNESS
49yo female with pmh alcohol abuse and alcohol withdrawal seizures/DT presents with ? seizure. 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 Per ems, it sounded like a seizure. She was post ictal with EMS. Pt currently AOx3, but does not know what  happened. No tongue pain/urinary/bowel incontinence. Pt reports last drink was 3 days ago. denies recent illness, fever, chest pain, sob, dizziness, headache   51yo female with pmh alcohol abuse and alcohol withdrawal seizures/DT presents with ? seizure. 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 Per ems, it sounded like a seizure. She was post ictal with EMS. Pt currently AOx3, but does not know what  happened. No tongue pain/urinary/bowel incontinence. Pt reports last drink was 3 days ago. denies recent illness, fever, chest pain, sob, dizziness, headache patient last drink was three days ago

## 2020-09-22 NOTE — ED ADULT NURSE NOTE - CHIEF COMPLAINT QUOTE
pt presents to the ED with c/o seizure like activity, was standing lowered to the ground as per family, denies any IC sx states first seizure last year, #22 left arm

## 2020-09-22 NOTE — ED PROVIDER NOTE - PHYSICAL EXAMINATION
Gen: Alert, Well appearing. NAD    Head: NC, AT, PERRL, normal lids/conjunctiva   ENT: Bilateral TM WNL, patent oropharynx without erythema/exudate, uvula midline  Neck: supple, no tenderness/meningismus  Pulm: Bilateral clear BS, normal resp effort  CV: RRR, no M/R/G, +dist pulses   Abd: soft, NT/ND, +BS, no guarding/rebound tenderness  Mskel: extremities x4 with normal ROM. no ctl spine ttp. no edema/erythema/cyanosis   Skin: no rash, no bruising  Neuro: AAOx3, no sensory/motor deficits, CN 2-12 intact, + mild tremors in hands

## 2020-09-22 NOTE — ED ADULT NURSE NOTE - NSIMPLEMENTINTERV_GEN_ALL_ED
Implemented All Fall with Harm Risk Interventions:  [place] to call system. Call bell, personal items and telephone within reach. Instruct patient to call for assistance. Room bathroom lighting operational. Non-slip footwear when patient is off stretcher. Physically safe environment: no spills, clutter or unnecessary equipment. Stretcher in lowest position, wheels locked, appropriate side rails in place. Provide visual cue, wrist band, yellow gown, etc. Monitor gait and stability. Monitor for mental status changes and reorient to person, place, and time. Review medications for side effects contributing to fall risk. Reinforce activity limits and safety measures with patient and family. Provide visual clues: red socks.

## 2020-09-22 NOTE — H&P ADULT - ASSESSMENT
[age]y old female with history of alchohol abuse with withdrawal seizure     IMPROVE VTE Individual Risk Assessment    RISK                                                          Points  [] Previous VTE                                           3  [] Thrombophilia                                        2  [] Lower limb paralysis                              2   [] Current Cancer                                       2   [] Immobilization > 24 hrs                        1  [] ICU/CCU stay > 24 hours                       1  [] Age > 60                                                   1    IMPROVE VTE Score:1

## 2020-09-22 NOTE — H&P ADULT - NSICDXFAMILYHX_GEN_ALL_CORE_FT
FAMILY HISTORY:  Mother  Still living? Yes, Estimated age: Age Unknown  Family history of diabetes mellitus, Age at diagnosis: Age Unknown

## 2020-09-23 VITALS
TEMPERATURE: 99 F | SYSTOLIC BLOOD PRESSURE: 151 MMHG | DIASTOLIC BLOOD PRESSURE: 93 MMHG | HEART RATE: 85 BPM | OXYGEN SATURATION: 98 % | RESPIRATION RATE: 17 BRPM

## 2020-09-23 LAB
ALBUMIN SERPL ELPH-MCNC: 3.5 G/DL — SIGNIFICANT CHANGE UP (ref 3.3–5)
ALP SERPL-CCNC: 86 U/L — SIGNIFICANT CHANGE UP (ref 40–120)
ALT FLD-CCNC: 84 U/L — HIGH (ref 12–78)
ANION GAP SERPL CALC-SCNC: 5 MMOL/L — SIGNIFICANT CHANGE UP (ref 5–17)
AST SERPL-CCNC: 176 U/L — HIGH (ref 15–37)
BILIRUB SERPL-MCNC: 1.9 MG/DL — HIGH (ref 0.2–1.2)
BUN SERPL-MCNC: 6 MG/DL — LOW (ref 7–23)
CALCIUM SERPL-MCNC: 9 MG/DL — SIGNIFICANT CHANGE UP (ref 8.5–10.1)
CHLORIDE SERPL-SCNC: 101 MMOL/L — SIGNIFICANT CHANGE UP (ref 96–108)
CO2 SERPL-SCNC: 34 MMOL/L — HIGH (ref 22–31)
CREAT SERPL-MCNC: 0.83 MG/DL — SIGNIFICANT CHANGE UP (ref 0.5–1.3)
GLUCOSE SERPL-MCNC: 107 MG/DL — HIGH (ref 70–99)
HCT VFR BLD CALC: 39 % — SIGNIFICANT CHANGE UP (ref 34.5–45)
HGB BLD-MCNC: 13 G/DL — SIGNIFICANT CHANGE UP (ref 11.5–15.5)
MAGNESIUM SERPL-MCNC: 2 MG/DL — SIGNIFICANT CHANGE UP (ref 1.6–2.6)
MCHC RBC-ENTMCNC: 31.6 PG — SIGNIFICANT CHANGE UP (ref 27–34)
MCHC RBC-ENTMCNC: 33.3 GM/DL — SIGNIFICANT CHANGE UP (ref 32–36)
MCV RBC AUTO: 94.9 FL — SIGNIFICANT CHANGE UP (ref 80–100)
NRBC # BLD: 0 /100 WBCS — SIGNIFICANT CHANGE UP (ref 0–0)
PLATELET # BLD AUTO: 124 K/UL — LOW (ref 150–400)
POTASSIUM SERPL-MCNC: 3.6 MMOL/L — SIGNIFICANT CHANGE UP (ref 3.5–5.3)
POTASSIUM SERPL-SCNC: 3.6 MMOL/L — SIGNIFICANT CHANGE UP (ref 3.5–5.3)
PROT SERPL-MCNC: 7.3 GM/DL — SIGNIFICANT CHANGE UP (ref 6–8.3)
RBC # BLD: 4.11 M/UL — SIGNIFICANT CHANGE UP (ref 3.8–5.2)
RBC # FLD: 14 % — SIGNIFICANT CHANGE UP (ref 10.3–14.5)
SARS-COV-2 IGG SERPL QL IA: NEGATIVE — SIGNIFICANT CHANGE UP
SARS-COV-2 IGM SERPL IA-ACNC: <0.1 INDEX — SIGNIFICANT CHANGE UP
SODIUM SERPL-SCNC: 140 MMOL/L — SIGNIFICANT CHANGE UP (ref 135–145)
WBC # BLD: 4.71 K/UL — SIGNIFICANT CHANGE UP (ref 3.8–10.5)
WBC # FLD AUTO: 4.71 K/UL — SIGNIFICANT CHANGE UP (ref 3.8–10.5)

## 2020-09-23 PROCEDURE — 99253 IP/OBS CNSLTJ NEW/EST LOW 45: CPT

## 2020-09-23 PROCEDURE — 99239 HOSP IP/OBS DSCHRG MGMT >30: CPT

## 2020-09-23 RX ORDER — LEVETIRACETAM 250 MG/1
500 TABLET, FILM COATED ORAL
Refills: 0 | Status: DISCONTINUED | OUTPATIENT
Start: 2020-09-23 | End: 2020-09-23

## 2020-09-23 RX ORDER — LEVETIRACETAM 250 MG/1
1 TABLET, FILM COATED ORAL
Qty: 60 | Refills: 0
Start: 2020-09-23 | End: 2020-10-22

## 2020-09-23 RX ADMIN — LEVETIRACETAM 500 MILLIGRAM(S): 250 TABLET, FILM COATED ORAL at 15:59

## 2020-09-23 RX ADMIN — Medication 1 MILLIGRAM(S): at 13:02

## 2020-09-23 RX ADMIN — Medication 325 MILLIGRAM(S): at 13:02

## 2020-09-23 RX ADMIN — PANTOPRAZOLE SODIUM 40 MILLIGRAM(S): 20 TABLET, DELAYED RELEASE ORAL at 06:03

## 2020-09-23 RX ADMIN — Medication 1 TABLET(S): at 13:02

## 2020-09-23 NOTE — DISCHARGE NOTE PROVIDER - CARE PROVIDERS DIRECT ADDRESSES
[email or web address]

## 2020-09-23 NOTE — DISCHARGE NOTE PROVIDER - HOSPITAL COURSE
49yo female with pmh alcohol abuse and alcohol withdrawal seizures/DT presents with ? seizure. 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 Per ems, it sounded like a seizure. She was post ictal with EMS. Pt currently AOx3, but does not know what  happened. No tongue pain/urinary/bowel incontinence. Pt reports last drink was 3 days ago. She denies recent illness, fever, chest pain, sob, dizziness, headache patient last drink was three days ago     ER course: Lactate 10, Head CT: no acute changes.     The patient was admitted to telemetry bed. Neurology was consulted and recommended starting the patient on Keppra. EEG was recommended but patient declined to have it done as inpatient. The patient has not have any seizure activity in last 24hours. She will be discharged with Keppra and will follow up with Neurology as outpatient to have EEG done. The patient was told to not drive until she is cleared by neurology; she understood and agreed.

## 2020-09-23 NOTE — CONSULT NOTE ADULT - SUBJECTIVE AND OBJECTIVE BOX
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  Last drink was reportedly 3 days ago, however, she says she has been drinking less (1 or 2 drinks per day).  Patient reports that in her life she has had about 3 seizures, all about 1-2 years apart.  She says she feels a warning of feeling dizzy prior to blacking out.  She has never been on antiepileptic medication.  She feels back to her baseline now, and is requesting to be discharged.  She does not drive.  She is willing to take an AED while work up is being completed.      No history of head trauma/meningitis/encephalitis/febrile seizures/birth trauma/developmental delay  +family history of epilepsy in paternal aunt    WBC = 5.79  Na =138  Gij=024  AST/ALT = 195/100  lactate = 10  CK = 350    Head CT - negative    NEUROLOGICAL EXAM:  T 98.8 F  /88   MS: Awake, alert, oriented x 4, language fluent, comprehension intact, naming intact, repetition intact, normal affect  CN: PERRLA, EOMI, visual fields intact, facial sensation intact, face symmetric, hearing intact, no dysarthria, symmetric palatal elevation, tongue midline, symmetric shoulder shrug and SCM strength  Motor: normal bulk, normal tone, power 5/5 in all four extremities, no tremors or fasciculations  Sensation: intact to light touch, pain, vibration sense, proprioception  Reflexes: 2+ at biceps, triceps, brachioradialis, patella, ankle bilaterally.  Flexor plantar response bilaterally.  No clonus  Coordination: no dysmetria  Gait: normal heel/toe/tandem walking, no ataxia  Romberg - negative    Assessment:  51 yo woman with recurrent convulsive seizures preceded by warning/aura of "dizziness".  History of ETOH abuse, however, unclear if seizures are only ETOH related.  +family history of epilepsy in paternal aunt.  Patient agrees to seizure prophylaxis.    Recommendations:  1. Keppra 500mg PO BID  2. If clinically stable, can follow up as outpatient for brain MRI and EEG  3. Seizure precautions discussed including no driving    Kenny Howard MD  [facility] Department of Neurology  Epilepsy Center

## 2020-09-23 NOTE — DISCHARGE NOTE PROVIDER - PROVIDER TOKENS
PROVIDER:[TOKEN:[[id number]],FOLLOWUP:[1 week]] PROVIDER:[TOKEN:[[id number]],FOLLOWUP:[1 week]],PROVIDER:[TOKEN:[[id number]]]

## 2020-09-23 NOTE — DISCHARGE NOTE NURSING/CASE MANAGEMENT/SOCIAL WORK - PATIENT PORTAL LINK FT
You can access the FollowMyHealth Patient Portal offered by [facility] by registering at the following website: [email or web address]. By joining [employer]’s FollowMyHealth portal, you will also be able to view your health information using other applications (apps) compatible with our system.

## 2020-09-23 NOTE — DISCHARGE NOTE PROVIDER - NSDCCPCAREPLAN_GEN_ALL_CORE_FT
PRINCIPAL DISCHARGE DIAGNOSIS  Diagnosis: Seizure  Assessment and Plan of Treatment: Continue with Keppra 500mg BID   Follow up with Neurology Dr. Howard, EEG is recommended.   Do not drive or operated heavy machinery until cleared by Neurologist.      SECONDARY DISCHARGE DIAGNOSES  Diagnosis: Alcohol abuse  Assessment and Plan of Treatment: Pt was counseled about health risk associated with excessive alcohol drinking.   - continue with home supplements

## 2020-09-23 NOTE — DISCHARGE NOTE PROVIDER - CARE PROVIDER_API CALL
Kenny Howard  CLINICAL NEUROPHYSIOLOGY  [address], [address]  [place] [address]  Phone: [phone]  Fax: [phone]  Follow Up Time: 1 week   Kenny Howard  CLINICAL NEUROPHYSIOLOGY  [address], [address]  [place] [address]  Phone: [phone]  Fax: [phone]  Follow Up Time: 1 week    Casimiro Ramos  INTERNAL MEDICINE  [address], [address]  [place] [address]  Phone: [phone]  Fax: [phone]  Follow Up Time:

## 2020-09-28 DIAGNOSIS — F10.10 ALCOHOL ABUSE, UNCOMPLICATED: ICD-10-CM

## 2020-09-28 DIAGNOSIS — Z82.0 FAMILY HISTORY OF EPILEPSY AND OTHER DISEASES OF THE NERVOUS SYSTEM: ICD-10-CM

## 2020-09-28 DIAGNOSIS — G40.909 EPILEPSY, UNSPECIFIED, NOT INTRACTABLE, WITHOUT STATUS EPILEPTICUS: ICD-10-CM

## 2020-09-28 DIAGNOSIS — R94.31 ABNORMAL ELECTROCARDIOGRAM [ECG] [EKG]: ICD-10-CM

## 2020-11-17 NOTE — PROGRESS NOTE BEHAVIORAL HEALTH - NS ED BHA MSE SPEECH SPONTANEITY
Normal
Normal/Other
Patient has a history of OA with bilateral hip replacement (2015 and 2016).   -holding tylenol in setting of transaminitis
Increased latency
Normal
Increased latency
Increased latency
Normal
Normal
Increased latency

## 2021-03-29 NOTE — PROGRESS NOTE BEHAVIORAL HEALTH - OTHER
unable to tolerate an MMSE at this time no overt tremors noted deferred at this time ABDOMINAL PAIN soft, long drawn out and slight slurring nods appropriate to context wants to leave superficially cooperative limited tenuous still but did improve

## 2022-07-11 NOTE — ED ADULT NURSE NOTE - CHPI ED NUR SEIZURE DURATION
ED Provider Note    CHIEF COMPLAINT  Chief Complaint   Patient presents with   • Sent by MD   • Constipation   • Abdominal Pain       HPI  Trevon Brennan is a 53 y.o. female who presents to the emergency department with abdominal pain and constipation.  Patient states that she has not had a bowel movement in 7 days.  No history of similar symptoms in the past.  She describes a lot of abdominal distention and discomfort especially on the left side of her abdomen.  No significant radiation of the pain.  She has had vomiting which started this morning.  No fevers or dysuria however she does state that it is difficult to urinate due to pressure in the abdomen.  She has had a prior appendectomy when she was a child, hysterectomy, and cholecystectomy.  No history of bowel obstructions in the past.  She has tried MiraLAX for the last 4 days taking once daily without relief.    REVIEW OF SYSTEMS  See HPI for further details.   Review of Systems   Constitutional: Negative for chills and fever.   HENT: Negative for sore throat.    Eyes: Negative for blurred vision and redness.   Respiratory: Negative for cough and shortness of breath.    Cardiovascular: Negative for chest pain and leg swelling.   Gastrointestinal: Positive for abdominal pain, nausea and vomiting.   Genitourinary: Negative for dysuria and urgency.   Musculoskeletal: Negative for back pain and neck pain.   Skin: Negative for rash.   Neurological: Negative for focal weakness, seizures and headaches.   Psychiatric/Behavioral: Negative for suicidal ideas.         PAST MEDICAL HISTORY   has a past medical history of Bowel habit changes, Depression (2016), Heart burn, Indigestion, migraines, and Urinary incontinence.    SOCIAL HISTORY  Social History     Tobacco Use   • Smoking status: Never Smoker   • Smokeless tobacco: Never Used   Vaping Use   • Vaping Use: Never used   Substance and Sexual Activity   • Alcohol use: Not Currently     Comment: sober 48 days  "  • Drug use: Not Currently     Comment: occasional marajuna   • Sexual activity: Not on file       SURGICAL HISTORY   has a past surgical history that includes other abdominal surgery; other abdominal surgery; gastroscopy-endo (12/21/2011); ercp in or (11/18/2012); vaginal hysterectomy scope total (1/3/2017); anterior and posterior repair (1/3/2017); enterocele repair (1/3/2017); bladder sling female (1/3/2017); and vaginal suspension (1/3/2017).    CURRENT MEDICATIONS  Home Medications     Reviewed by Julianne Strong R.N. (Registered Nurse) on 07/11/22 at 1137  Med List Status: Partial   Medication Last Dose Status   amoxicillin-clavulanate (AUGMENTIN) 875-125 MG Tab  Active   calcium carbonate (TUMS E-X 750) 750 MG chewable tablet  Active   Cariprazine HCl (VRAYLAR) 4.5 MG Cap  Active   fluticasone (FLONASE ALLERGY RELIEF) 50 MCG/ACT nasal spray  Active   folic acid (FOLVITE) 1 MG Tab  Active   gabapentin (NEURONTIN) 300 MG Cap  Active   guaiFENesin (MUCINEX PO)  Active   multivitamin (THERAGRAN) Tab  Active   naltrexone (DEPADE) 50 MG Tab  Active   ondansetron (ZOFRAN) 8 MG Tab  Active   potassium chloride SA (KDUR) 20 MEQ Tab CR  Active   prazosin (MINIPRESS) 1 MG Cap  Active   QUEtiapine (SEROQUEL) 100 MG Tab  Active                ALLERGIES  Allergies   Allergen Reactions   • Erythromycin Hives     Hives for a week       PHYSICAL EXAM   VITAL SIGNS: BP (!) 154/81   Pulse 71   Temp 36.4 °C (97.5 °F)   Resp 16   Ht 1.575 m (5' 2\")   Wt 63.2 kg (139 lb 5.3 oz)   LMP 03/01/2009   SpO2 98%   BMI 25.48 kg/m²      Physical Exam  Constitutional:       General: She is not in acute distress.  HENT:      Head: Normocephalic and atraumatic.   Eyes:      Conjunctiva/sclera: Conjunctivae normal.      Pupils: Pupils are equal, round, and reactive to light.   Cardiovascular:      Rate and Rhythm: Normal rate and regular rhythm.      Heart sounds: Normal heart sounds.   Pulmonary:      Effort: Pulmonary effort is " "normal. No respiratory distress.      Breath sounds: Normal breath sounds.   Abdominal:      General: There is no distension.      Palpations: Abdomen is soft.      Tenderness: There is no abdominal tenderness.      Comments: Mild abdominal distention, left lower quadrant tenderness to palpation   Genitourinary:     Comments: Rectal exam with large amount of soft stool in rectal vault  Musculoskeletal:         General: No tenderness. Normal range of motion.      Cervical back: Normal range of motion and neck supple.   Skin:     General: Skin is warm and dry.   Neurological:      Mental Status: She is alert and oriented to person, place, and time.      Comments: Moving all extremities spontaneously   Psychiatric:         Mood and Affect: Affect normal.           DIAGNOSTIC STUDIES      LABS  Personally reviewed by me  Labs Reviewed   CBC WITH DIFFERENTIAL - Abnormal; Notable for the following components:       Result Value    MPV 8.8 (*)     Lymphocytes 20.30 (*)     All other components within normal limits   COMP METABOLIC PANEL - Abnormal; Notable for the following components:    Glucose 106 (*)     Bun 6 (*)     Albumin 5.0 (*)     All other components within normal limits   LIPASE   URINALYSIS   ESTIMATED GFR           RADIOLOGY  Personally reviewed by me  CT-ABDOMEN-PELVIS WITH   Final Result      1.  Increased colonic stool suggesting constipation.   2.  Prominent stool in the rectum with surrounding edema suggesting stercoral colitis.   3.  No bowel obstruction or perforation.   4.  Mild biliary dilation, likely postoperative.              ED COURSE  Vitals:    07/11/22 1126 07/11/22 1133 07/11/22 1328 07/11/22 1757   BP: (!) 161/92  132/84 (!) 154/81   Pulse: 91  74 71   Resp: 18  18 16   Temp: 36.3 °C (97.4 °F)   36.4 °C (97.5 °F)   TempSrc: Temporal      SpO2: 98%  100% 98%   Weight:  63.2 kg (139 lb 5.3 oz)     Height: 1.575 m (5' 2\")            Medications administered:  Medications   morphine 4 MG/ML " injection 4 mg (4 mg Intravenous Given 7/11/22 1527)   ondansetron (ZOFRAN) syringe/vial injection 4 mg (4 mg Intravenous Given 7/11/22 1527)   NS infusion 1,000 mL (0 mL Intravenous Stopped 7/11/22 1641)   iohexol (OMNIPAQUE) 350 mg/mL (IV) (75 mL Intravenous Given 7/11/22 1700)           MEDICAL DECISION MAKING  Patient presents with one week history of constipation and abdominal pain.  She is afebrile with normal vitals on arrival.  Labs are reassuring without leukocytosis, transaminitis, or elevated lipase concerning for pancreatitis.  She does have large amount of stool in rectal vault.  Imaging shows this with some surrounding inflammation however doubt bacterial colitis.  No evidence of bowel obstruction, appendicitis, diverticulitis.  Following soap suds enema, patient was able to have large bowel movement with significant improvement of symptoms.    Upon reassessment, patient is resting comfortably with normal vital signs.  No new complaints at this time.  Repeat abdominal exam benign.  Discussed results with patient and/or family as well as importance of primary care follow up.  Patient understands plan of care and strict return precautions for new or changing symptoms.         IMPRESSION  (K59.00) Constipation, unspecified constipation type  (R10.84) Generalized abdominal pain    Disposition: Discharge home, stable condition  Results, diagnoses, and treatment options were discussed with the patient and/or family. Patient verbalized understanding of plan of care.    Patient referred to primary care provider for monitoring and treatment of blood pressure.      Discharge Medication List as of 7/11/2022  5:57 PM      START taking these medications    Details   polyethylene glycol/lytes (MIRALAX) 17 g Pack Take 1 Packet by mouth 1 time a day as needed (constipation)., Disp-30 Each, R-0, Normal                 Electronically signed by: Trixie Medellin M.D., 7/11/2022 4:02 PM         unknown

## 2024-10-21 NOTE — ED ADULT TRIAGE NOTE - CHIEF COMPLAINT QUOTE
pt presents to the ED with c/o seizure like activity, was standing lowered to the ground as per family, denies any IC sx states first seizure last year, #22 left arm 
Yes